# Patient Record
Sex: FEMALE | Race: WHITE | NOT HISPANIC OR LATINO | Employment: OTHER | URBAN - METROPOLITAN AREA
[De-identification: names, ages, dates, MRNs, and addresses within clinical notes are randomized per-mention and may not be internally consistent; named-entity substitution may affect disease eponyms.]

---

## 2018-01-01 ENCOUNTER — HOSPITAL ENCOUNTER (EMERGENCY)
Facility: HOSPITAL | Age: 58
Discharge: HOME | End: 2018-11-08
Attending: EMERGENCY MEDICINE | Admitting: EMERGENCY MEDICINE

## 2018-01-01 ENCOUNTER — APPOINTMENT (OUTPATIENT)
Dept: GENERAL RADIOLOGY | Facility: HOSPITAL | Age: 58
End: 2018-01-01

## 2018-01-01 ENCOUNTER — HOSPITAL ENCOUNTER (OUTPATIENT)
Facility: HOSPITAL | Age: 58
Setting detail: OBSERVATION
LOS: 1 days | Discharge: HOME | End: 2018-10-04
Attending: EMERGENCY MEDICINE | Admitting: EMERGENCY MEDICINE

## 2018-01-01 ENCOUNTER — APPOINTMENT (OUTPATIENT)
Dept: CT IMAGING | Facility: HOSPITAL | Age: 58
End: 2018-01-01

## 2018-01-01 VITALS
SYSTOLIC BLOOD PRESSURE: 210 MMHG | HEIGHT: 59 IN | TEMPERATURE: 97.7 F | HEART RATE: 67 BPM | OXYGEN SATURATION: 100 % | DIASTOLIC BLOOD PRESSURE: 94 MMHG | RESPIRATION RATE: 16 BRPM

## 2018-01-01 VITALS
HEIGHT: 60 IN | OXYGEN SATURATION: 96 % | RESPIRATION RATE: 18 BRPM | DIASTOLIC BLOOD PRESSURE: 82 MMHG | TEMPERATURE: 98.2 F | BODY MASS INDEX: 52.03 KG/M2 | SYSTOLIC BLOOD PRESSURE: 173 MMHG | HEART RATE: 68 BPM | WEIGHT: 264.99 LBS

## 2018-01-01 DIAGNOSIS — R41.82 ALTERED MENTAL STATUS, UNSPECIFIED ALTERED MENTAL STATUS TYPE: Primary | ICD-10-CM

## 2018-01-01 DIAGNOSIS — N18.9 CHRONIC KIDNEY DISEASE, UNSPECIFIED CKD STAGE: ICD-10-CM

## 2018-01-01 DIAGNOSIS — E87.5 HYPERKALEMIA: Primary | ICD-10-CM

## 2018-01-01 DIAGNOSIS — I10 HYPERTENSION, UNSPECIFIED TYPE: ICD-10-CM

## 2018-01-01 DIAGNOSIS — E16.2 HYPOGLYCEMIA: ICD-10-CM

## 2018-01-01 DIAGNOSIS — M79.89 SWELLING OF LEFT HAND: ICD-10-CM

## 2018-01-01 DIAGNOSIS — N39.0 ACUTE UTI: ICD-10-CM

## 2018-01-01 DIAGNOSIS — N19 RENAL FAILURE, UNSPECIFIED CHRONICITY: ICD-10-CM

## 2018-01-01 LAB
ALBUMIN SERPL-MCNC: 2.5 G/DL (ref 3.5–5.2)
ALBUMIN SERPL-MCNC: 2.9 G/DL (ref 3.5–5.2)
ALBUMIN SERPL-MCNC: 3.2 G/DL (ref 3.5–5.2)
ALBUMIN SERPL-MCNC: 3.5 G/DL (ref 3.5–5.2)
ALBUMIN/GLOB SERPL: 0.7 G/DL
ALBUMIN/GLOB SERPL: 0.8 G/DL
ALBUMIN/GLOB SERPL: 0.8 G/DL
ALP SERPL-CCNC: 199 U/L (ref 39–117)
ALP SERPL-CCNC: 207 U/L (ref 39–117)
ALP SERPL-CCNC: 248 U/L (ref 39–117)
ALT SERPL W P-5'-P-CCNC: 17 U/L (ref 1–33)
ALT SERPL W P-5'-P-CCNC: 21 U/L (ref 1–33)
ALT SERPL W P-5'-P-CCNC: 21 U/L (ref 1–33)
ANION GAP SERPL CALCULATED.3IONS-SCNC: 10.3 MMOL/L
ANION GAP SERPL CALCULATED.3IONS-SCNC: 10.8 MMOL/L
ANION GAP SERPL CALCULATED.3IONS-SCNC: 13.4 MMOL/L
ANION GAP SERPL CALCULATED.3IONS-SCNC: 13.7 MMOL/L
AST SERPL-CCNC: 18 U/L (ref 1–32)
AST SERPL-CCNC: 24 U/L (ref 1–32)
AST SERPL-CCNC: 24 U/L (ref 1–32)
BACTERIA SPEC AEROBE CULT: ABNORMAL
BACTERIA SPEC AEROBE CULT: NORMAL
BACTERIA SPEC AEROBE CULT: NORMAL
BACTERIA UR QL AUTO: ABNORMAL /HPF
BASOPHILS # BLD AUTO: 0.01 10*3/MM3 (ref 0–0.2)
BASOPHILS # BLD AUTO: 0.02 10*3/MM3 (ref 0–0.2)
BASOPHILS # BLD AUTO: 0.02 10*3/MM3 (ref 0–0.2)
BASOPHILS NFR BLD AUTO: 0.1 % (ref 0–1.5)
BASOPHILS NFR BLD AUTO: 0.2 % (ref 0–1.5)
BASOPHILS NFR BLD AUTO: 0.2 % (ref 0–1.5)
BILIRUB SERPL-MCNC: 0.2 MG/DL (ref 0.1–1.2)
BILIRUB SERPL-MCNC: 0.2 MG/DL (ref 0.1–1.2)
BILIRUB SERPL-MCNC: 0.3 MG/DL (ref 0.1–1.2)
BILIRUB UR QL STRIP: NEGATIVE
BUN BLD-MCNC: 29 MG/DL (ref 8–23)
BUN BLD-MCNC: 32 MG/DL (ref 8–23)
BUN BLD-MCNC: 34 MG/DL (ref 8–23)
BUN BLD-MCNC: 44 MG/DL (ref 8–23)
BUN/CREAT SERPL: 10.4 (ref 7–25)
BUN/CREAT SERPL: 11.5 (ref 7–25)
BUN/CREAT SERPL: 12.2 (ref 7–25)
BUN/CREAT SERPL: 14.3 (ref 7–25)
CALCIUM SPEC-SCNC: 8.6 MG/DL (ref 8.6–10.5)
CALCIUM SPEC-SCNC: 8.8 MG/DL (ref 8.6–10.5)
CALCIUM SPEC-SCNC: 9.1 MG/DL (ref 8.6–10.5)
CALCIUM SPEC-SCNC: 9.2 MG/DL (ref 8.6–10.5)
CHLORIDE SERPL-SCNC: 110 MMOL/L (ref 98–107)
CHLORIDE SERPL-SCNC: 110 MMOL/L (ref 98–107)
CHLORIDE SERPL-SCNC: 111 MMOL/L (ref 98–107)
CHLORIDE SERPL-SCNC: 112 MMOL/L (ref 98–107)
CLARITY UR: ABNORMAL
CO2 SERPL-SCNC: 21.2 MMOL/L (ref 22–29)
CO2 SERPL-SCNC: 21.6 MMOL/L (ref 22–29)
CO2 SERPL-SCNC: 22.3 MMOL/L (ref 22–29)
CO2 SERPL-SCNC: 22.7 MMOL/L (ref 22–29)
COLOR UR: YELLOW
CREAT BLD-MCNC: 2.53 MG/DL (ref 0.57–1)
CREAT BLD-MCNC: 2.79 MG/DL (ref 0.57–1)
CREAT BLD-MCNC: 3.07 MG/DL (ref 0.57–1)
CREAT BLD-MCNC: 3.07 MG/DL (ref 0.57–1)
D-LACTATE SERPL-SCNC: 1.9 MMOL/L (ref 0.5–2)
DEPRECATED RDW RBC AUTO: 53.3 FL (ref 37–54)
DEPRECATED RDW RBC AUTO: 55 FL (ref 37–54)
DEPRECATED RDW RBC AUTO: 55.2 FL (ref 37–54)
DEPRECATED RDW RBC AUTO: 55.4 FL (ref 37–54)
EOSINOPHIL # BLD AUTO: 0.17 10*3/MM3 (ref 0–0.7)
EOSINOPHIL # BLD AUTO: 0.23 10*3/MM3 (ref 0–0.7)
EOSINOPHIL # BLD AUTO: 0.44 10*3/MM3 (ref 0–0.7)
EOSINOPHIL NFR BLD AUTO: 1.4 % (ref 0.3–6.2)
EOSINOPHIL NFR BLD AUTO: 1.8 % (ref 0.3–6.2)
EOSINOPHIL NFR BLD AUTO: 4 % (ref 0.3–6.2)
ERYTHROCYTE [DISTWIDTH] IN BLOOD BY AUTOMATED COUNT: 14.7 % (ref 11.7–13)
ERYTHROCYTE [DISTWIDTH] IN BLOOD BY AUTOMATED COUNT: 15.1 % (ref 11.7–13)
ERYTHROCYTE [DISTWIDTH] IN BLOOD BY AUTOMATED COUNT: 15.2 % (ref 11.7–13)
ERYTHROCYTE [DISTWIDTH] IN BLOOD BY AUTOMATED COUNT: 15.5 % (ref 11.7–13)
GFR SERPL CREATININE-BSD FRML MDRD: 15 ML/MIN/1.73
GFR SERPL CREATININE-BSD FRML MDRD: 15 ML/MIN/1.73
GFR SERPL CREATININE-BSD FRML MDRD: 17 ML/MIN/1.73
GFR SERPL CREATININE-BSD FRML MDRD: 19 ML/MIN/1.73
GLOBULIN UR ELPH-MCNC: 3.9 GM/DL
GLOBULIN UR ELPH-MCNC: 4.1 GM/DL
GLOBULIN UR ELPH-MCNC: 4.2 GM/DL
GLUCOSE BLD-MCNC: 148 MG/DL (ref 65–99)
GLUCOSE BLD-MCNC: 52 MG/DL (ref 65–99)
GLUCOSE BLD-MCNC: 75 MG/DL (ref 65–99)
GLUCOSE BLD-MCNC: 95 MG/DL (ref 65–99)
GLUCOSE BLDC GLUCOMTR-MCNC: 132 MG/DL (ref 70–130)
GLUCOSE BLDC GLUCOMTR-MCNC: 141 MG/DL (ref 70–130)
GLUCOSE BLDC GLUCOMTR-MCNC: 175 MG/DL (ref 70–130)
GLUCOSE BLDC GLUCOMTR-MCNC: 179 MG/DL (ref 70–130)
GLUCOSE BLDC GLUCOMTR-MCNC: 185 MG/DL (ref 70–130)
GLUCOSE BLDC GLUCOMTR-MCNC: 195 MG/DL (ref 70–130)
GLUCOSE BLDC GLUCOMTR-MCNC: 263 MG/DL (ref 70–130)
GLUCOSE BLDC GLUCOMTR-MCNC: 74 MG/DL (ref 70–130)
GLUCOSE BLDC GLUCOMTR-MCNC: 75 MG/DL (ref 70–130)
GLUCOSE BLDC GLUCOMTR-MCNC: 84 MG/DL (ref 70–130)
GLUCOSE UR STRIP-MCNC: ABNORMAL MG/DL
HBA1C MFR BLD: 6.7 % (ref 4.8–5.6)
HCT VFR BLD AUTO: 32.4 % (ref 35.6–45.5)
HCT VFR BLD AUTO: 34.8 % (ref 35.6–45.5)
HCT VFR BLD AUTO: 35.7 % (ref 35.6–45.5)
HCT VFR BLD AUTO: 37 % (ref 35.6–45.5)
HGB BLD-MCNC: 10.5 G/DL (ref 11.9–15.5)
HGB BLD-MCNC: 10.7 G/DL (ref 11.9–15.5)
HGB BLD-MCNC: 11.3 G/DL (ref 11.9–15.5)
HGB BLD-MCNC: 9.8 G/DL (ref 11.9–15.5)
HGB UR QL STRIP.AUTO: ABNORMAL
HYALINE CASTS UR QL AUTO: ABNORMAL /LPF
IMM GRANULOCYTES # BLD: 0.04 10*3/MM3 (ref 0–0.03)
IMM GRANULOCYTES # BLD: 0.05 10*3/MM3 (ref 0–0.03)
IMM GRANULOCYTES # BLD: 0.06 10*3/MM3 (ref 0–0.03)
IMM GRANULOCYTES NFR BLD: 0.3 % (ref 0–0.5)
IMM GRANULOCYTES NFR BLD: 0.4 % (ref 0–0.5)
IMM GRANULOCYTES NFR BLD: 0.5 % (ref 0–0.5)
KETONES UR QL STRIP: NEGATIVE
LEUKOCYTE ESTERASE UR QL STRIP.AUTO: ABNORMAL
LYMPHOCYTES # BLD AUTO: 1.56 10*3/MM3 (ref 0.9–4.8)
LYMPHOCYTES # BLD AUTO: 2 10*3/MM3 (ref 0.9–4.8)
LYMPHOCYTES # BLD AUTO: 2.04 10*3/MM3 (ref 0.9–4.8)
LYMPHOCYTES NFR BLD AUTO: 14.1 % (ref 19.6–45.3)
LYMPHOCYTES NFR BLD AUTO: 16 % (ref 19.6–45.3)
LYMPHOCYTES NFR BLD AUTO: 16.1 % (ref 19.6–45.3)
MAGNESIUM SERPL-MCNC: 2.2 MG/DL (ref 1.6–2.4)
MCH RBC QN AUTO: 29.5 PG (ref 26.9–32)
MCH RBC QN AUTO: 29.6 PG (ref 26.9–32)
MCH RBC QN AUTO: 30.1 PG (ref 26.9–32)
MCH RBC QN AUTO: 30.3 PG (ref 26.9–32)
MCHC RBC AUTO-ENTMCNC: 30 G/DL (ref 32.4–36.3)
MCHC RBC AUTO-ENTMCNC: 30.2 G/DL (ref 32.4–36.3)
MCHC RBC AUTO-ENTMCNC: 30.2 G/DL (ref 32.4–36.3)
MCHC RBC AUTO-ENTMCNC: 30.5 G/DL (ref 32.4–36.3)
MCV RBC AUTO: 98 FL (ref 80.5–98.2)
MCV RBC AUTO: 98.3 FL (ref 80.5–98.2)
MCV RBC AUTO: 99.2 FL (ref 80.5–98.2)
MCV RBC AUTO: 99.4 FL (ref 80.5–98.2)
MONOCYTES # BLD AUTO: 0.63 10*3/MM3 (ref 0.2–1.2)
MONOCYTES # BLD AUTO: 0.91 10*3/MM3 (ref 0.2–1.2)
MONOCYTES # BLD AUTO: 1.1 10*3/MM3 (ref 0.2–1.2)
MONOCYTES NFR BLD AUTO: 5.7 % (ref 5–12)
MONOCYTES NFR BLD AUTO: 7.1 % (ref 5–12)
MONOCYTES NFR BLD AUTO: 8.8 % (ref 5–12)
MUCOUS THREADS URNS QL MICRO: ABNORMAL /HPF
NEUTROPHILS # BLD AUTO: 8.39 10*3/MM3 (ref 1.9–8.1)
NEUTROPHILS # BLD AUTO: 9.14 10*3/MM3 (ref 1.9–8.1)
NEUTROPHILS # BLD AUTO: 9.49 10*3/MM3 (ref 1.9–8.1)
NEUTROPHILS NFR BLD AUTO: 73.5 % (ref 42.7–76)
NEUTROPHILS NFR BLD AUTO: 74.5 % (ref 42.7–76)
NEUTROPHILS NFR BLD AUTO: 76.1 % (ref 42.7–76)
NITRITE UR QL STRIP: POSITIVE
PH UR STRIP.AUTO: 6.5 [PH] (ref 5–8)
PHOSPHATE SERPL-MCNC: 4.4 MG/DL (ref 2.5–4.5)
PLATELET # BLD AUTO: 213 10*3/MM3 (ref 140–500)
PLATELET # BLD AUTO: 220 10*3/MM3 (ref 140–500)
PLATELET # BLD AUTO: 236 10*3/MM3 (ref 140–500)
PLATELET # BLD AUTO: 241 10*3/MM3 (ref 140–500)
PMV BLD AUTO: 9.4 FL (ref 6–12)
PMV BLD AUTO: 9.4 FL (ref 6–12)
PMV BLD AUTO: 9.6 FL (ref 6–12)
PMV BLD AUTO: 9.7 FL (ref 6–12)
POTASSIUM BLD-SCNC: 3.7 MMOL/L (ref 3.5–5.2)
POTASSIUM BLD-SCNC: 4.2 MMOL/L (ref 3.5–5.2)
POTASSIUM BLD-SCNC: 4.6 MMOL/L (ref 3.5–5.2)
POTASSIUM BLD-SCNC: 5.8 MMOL/L (ref 3.5–5.2)
PROCALCITONIN SERPL-MCNC: 0.12 NG/ML (ref 0.1–0.25)
PROT SERPL-MCNC: 6.8 G/DL (ref 6–8.5)
PROT SERPL-MCNC: 7.3 G/DL (ref 6–8.5)
PROT SERPL-MCNC: 7.7 G/DL (ref 6–8.5)
PROT UR QL STRIP: ABNORMAL
RBC # BLD AUTO: 3.26 10*6/MM3 (ref 3.9–5.2)
RBC # BLD AUTO: 3.55 10*6/MM3 (ref 3.9–5.2)
RBC # BLD AUTO: 3.63 10*6/MM3 (ref 3.9–5.2)
RBC # BLD AUTO: 3.73 10*6/MM3 (ref 3.9–5.2)
RBC # UR: ABNORMAL /HPF
REF LAB TEST METHOD: ABNORMAL
SODIUM BLD-SCNC: 142 MMOL/L (ref 136–145)
SODIUM BLD-SCNC: 145 MMOL/L (ref 136–145)
SODIUM BLD-SCNC: 145 MMOL/L (ref 136–145)
SODIUM BLD-SCNC: 147 MMOL/L (ref 136–145)
SP GR UR STRIP: 1.02 (ref 1–1.03)
SQUAMOUS #/AREA URNS HPF: ABNORMAL /HPF
T4 FREE SERPL-MCNC: 0.96 NG/DL (ref 0.93–1.7)
TSH SERPL DL<=0.05 MIU/L-ACNC: 9.33 MIU/ML (ref 0.27–4.2)
UROBILINOGEN UR QL STRIP: ABNORMAL
WBC NRBC COR # BLD: 11.03 10*3/MM3 (ref 4.5–10.7)
WBC NRBC COR # BLD: 12.43 10*3/MM3 (ref 4.5–10.7)
WBC NRBC COR # BLD: 12.74 10*3/MM3 (ref 4.5–10.7)
WBC NRBC COR # BLD: 8.88 10*3/MM3 (ref 4.5–10.7)
WBC UR QL AUTO: ABNORMAL /HPF

## 2018-01-01 PROCEDURE — 80069 RENAL FUNCTION PANEL: CPT | Performed by: HOSPITALIST

## 2018-01-01 PROCEDURE — 87186 SC STD MICRODIL/AGAR DIL: CPT | Performed by: EMERGENCY MEDICINE

## 2018-01-01 PROCEDURE — 96376 TX/PRO/DX INJ SAME DRUG ADON: CPT

## 2018-01-01 PROCEDURE — 80053 COMPREHEN METABOLIC PANEL: CPT | Performed by: PHYSICIAN ASSISTANT

## 2018-01-01 PROCEDURE — 73130 X-RAY EXAM OF HAND: CPT

## 2018-01-01 PROCEDURE — 84439 ASSAY OF FREE THYROXINE: CPT | Performed by: EMERGENCY MEDICINE

## 2018-01-01 PROCEDURE — 85025 COMPLETE CBC W/AUTO DIFF WBC: CPT | Performed by: PHYSICIAN ASSISTANT

## 2018-01-01 PROCEDURE — 80053 COMPREHEN METABOLIC PANEL: CPT | Performed by: NURSE PRACTITIONER

## 2018-01-01 PROCEDURE — 85027 COMPLETE CBC AUTOMATED: CPT | Performed by: HOSPITALIST

## 2018-01-01 PROCEDURE — 96365 THER/PROPH/DIAG IV INF INIT: CPT

## 2018-01-01 PROCEDURE — 96375 TX/PRO/DX INJ NEW DRUG ADDON: CPT

## 2018-01-01 PROCEDURE — 25010000002 INFLUENZA VAC SUBUNIT QUAD 0.5 ML SUSPENSION PREFILLED SYRINGE: Performed by: HOSPITALIST

## 2018-01-01 PROCEDURE — 85025 COMPLETE CBC W/AUTO DIFF WBC: CPT | Performed by: NURSE PRACTITIONER

## 2018-01-01 PROCEDURE — 83036 HEMOGLOBIN GLYCOSYLATED A1C: CPT | Performed by: NURSE PRACTITIONER

## 2018-01-01 PROCEDURE — 83605 ASSAY OF LACTIC ACID: CPT | Performed by: EMERGENCY MEDICINE

## 2018-01-01 PROCEDURE — 84145 PROCALCITONIN (PCT): CPT | Performed by: EMERGENCY MEDICINE

## 2018-01-01 PROCEDURE — G0378 HOSPITAL OBSERVATION PER HR: HCPCS

## 2018-01-01 PROCEDURE — 63710000001 INSULIN ASPART PER 5 UNITS: Performed by: HOSPITALIST

## 2018-01-01 PROCEDURE — 82962 GLUCOSE BLOOD TEST: CPT

## 2018-01-01 PROCEDURE — 63710000001 INSULIN ASPART PER 5 UNITS: Performed by: NURSE PRACTITIONER

## 2018-01-01 PROCEDURE — 87040 BLOOD CULTURE FOR BACTERIA: CPT | Performed by: EMERGENCY MEDICINE

## 2018-01-01 PROCEDURE — 84443 ASSAY THYROID STIM HORMONE: CPT | Performed by: EMERGENCY MEDICINE

## 2018-01-01 PROCEDURE — 83735 ASSAY OF MAGNESIUM: CPT | Performed by: EMERGENCY MEDICINE

## 2018-01-01 PROCEDURE — 25010000002 CEFTRIAXONE PER 250 MG: Performed by: NURSE PRACTITIONER

## 2018-01-01 PROCEDURE — 96361 HYDRATE IV INFUSION ADD-ON: CPT

## 2018-01-01 PROCEDURE — 25010000002 CEFTRIAXONE PER 250 MG: Performed by: EMERGENCY MEDICINE

## 2018-01-01 PROCEDURE — 99285 EMERGENCY DEPT VISIT HI MDM: CPT

## 2018-01-01 PROCEDURE — 81001 URINALYSIS AUTO W/SCOPE: CPT | Performed by: EMERGENCY MEDICINE

## 2018-01-01 PROCEDURE — 87086 URINE CULTURE/COLONY COUNT: CPT | Performed by: EMERGENCY MEDICINE

## 2018-01-01 PROCEDURE — G0008 ADMIN INFLUENZA VIRUS VAC: HCPCS | Performed by: HOSPITALIST

## 2018-01-01 PROCEDURE — 80053 COMPREHEN METABOLIC PANEL: CPT | Performed by: EMERGENCY MEDICINE

## 2018-01-01 PROCEDURE — 96366 THER/PROPH/DIAG IV INF ADDON: CPT

## 2018-01-01 PROCEDURE — 99284 EMERGENCY DEPT VISIT MOD MDM: CPT

## 2018-01-01 PROCEDURE — 85025 COMPLETE CBC W/AUTO DIFF WBC: CPT | Performed by: EMERGENCY MEDICINE

## 2018-01-01 PROCEDURE — 90661 CCIIV3 VAC ABX FR 0.5 ML IM: CPT | Performed by: HOSPITALIST

## 2018-01-01 PROCEDURE — 70450 CT HEAD/BRAIN W/O DYE: CPT

## 2018-01-01 PROCEDURE — 90791 PSYCH DIAGNOSTIC EVALUATION: CPT | Performed by: SOCIAL WORKER

## 2018-01-01 RX ORDER — FUROSEMIDE 20 MG/1
20 TABLET ORAL 2 TIMES DAILY
Status: ON HOLD | COMMUNITY
End: 2019-01-01

## 2018-01-01 RX ORDER — SODIUM CHLORIDE 0.9 % (FLUSH) 0.9 %
1-10 SYRINGE (ML) INJECTION AS NEEDED
Status: DISCONTINUED | OUTPATIENT
Start: 2018-01-01 | End: 2018-01-01 | Stop reason: HOSPADM

## 2018-01-01 RX ORDER — QUETIAPINE FUMARATE 25 MG/1
25 TABLET, FILM COATED ORAL
Qty: 30 TABLET | Refills: 0 | Status: ON HOLD | OUTPATIENT
Start: 2018-01-01 | End: 2019-01-01

## 2018-01-01 RX ORDER — SODIUM CHLORIDE 0.9 % (FLUSH) 0.9 %
3 SYRINGE (ML) INJECTION EVERY 12 HOURS SCHEDULED
Status: DISCONTINUED | OUTPATIENT
Start: 2018-01-01 | End: 2018-01-01

## 2018-01-01 RX ORDER — ESCITALOPRAM OXALATE 10 MG/1
10 TABLET ORAL DAILY
Qty: 30 TABLET | Refills: 0 | Status: ON HOLD | OUTPATIENT
Start: 2018-01-01 | End: 2019-01-01

## 2018-01-01 RX ORDER — SODIUM CHLORIDE 0.9 % (FLUSH) 0.9 %
10 SYRINGE (ML) INJECTION AS NEEDED
Status: DISCONTINUED | OUTPATIENT
Start: 2018-01-01 | End: 2018-01-01 | Stop reason: HOSPADM

## 2018-01-01 RX ORDER — INSULIN ASPART 100 [IU]/ML
35 INJECTION, SUSPENSION SUBCUTANEOUS 2 TIMES DAILY WITH MEALS
Refills: 12 | Status: ON HOLD
Start: 2018-01-01 | End: 2019-01-01

## 2018-01-01 RX ORDER — AMLODIPINE BESYLATE 5 MG/1
5 TABLET ORAL
Status: DISCONTINUED | OUTPATIENT
Start: 2018-01-01 | End: 2018-01-01 | Stop reason: HOSPADM

## 2018-01-01 RX ORDER — CEFTRIAXONE SODIUM 1 G/50ML
1 INJECTION, SOLUTION INTRAVENOUS EVERY 24 HOURS
Status: DISCONTINUED | OUTPATIENT
Start: 2018-01-01 | End: 2018-01-01 | Stop reason: HOSPADM

## 2018-01-01 RX ORDER — TOPIRAMATE 25 MG/1
25 TABLET ORAL EVERY 12 HOURS SCHEDULED
Status: DISCONTINUED | OUTPATIENT
Start: 2018-01-01 | End: 2018-01-01 | Stop reason: HOSPADM

## 2018-01-01 RX ORDER — INSULIN ASPART 100 [IU]/ML
90 INJECTION, SUSPENSION SUBCUTANEOUS 2 TIMES DAILY WITH MEALS
Status: DISCONTINUED | OUTPATIENT
Start: 2018-01-01 | End: 2018-01-01

## 2018-01-01 RX ORDER — LEVOTHYROXINE SODIUM 0.03 MG/1
25 TABLET ORAL
Status: DISCONTINUED | OUTPATIENT
Start: 2018-01-01 | End: 2018-01-01 | Stop reason: HOSPADM

## 2018-01-01 RX ORDER — TOPIRAMATE 25 MG/1
25 CAPSULE, COATED PELLETS ORAL DAILY
Status: ON HOLD | COMMUNITY
End: 2019-01-01

## 2018-01-01 RX ORDER — INSULIN ASPART 100 [IU]/ML
35 INJECTION, SUSPENSION SUBCUTANEOUS 2 TIMES DAILY WITH MEALS
Status: DISCONTINUED | OUTPATIENT
Start: 2018-01-01 | End: 2018-01-01 | Stop reason: HOSPADM

## 2018-01-01 RX ORDER — DEXTROSE MONOHYDRATE 25 G/50ML
25 INJECTION, SOLUTION INTRAVENOUS
Status: DISCONTINUED | OUTPATIENT
Start: 2018-01-01 | End: 2018-01-01 | Stop reason: HOSPADM

## 2018-01-01 RX ORDER — QUETIAPINE FUMARATE 25 MG/1
25 TABLET, FILM COATED ORAL
Status: DISCONTINUED | OUTPATIENT
Start: 2018-01-01 | End: 2018-01-01 | Stop reason: HOSPADM

## 2018-01-01 RX ORDER — LEVOTHYROXINE SODIUM 0.03 MG/1
25 TABLET ORAL DAILY
Qty: 30 TABLET | Refills: 0 | Status: ON HOLD | OUTPATIENT
Start: 2018-01-01 | End: 2019-01-01

## 2018-01-01 RX ORDER — INSULIN ASPART 100 [IU]/ML
40 INJECTION, SUSPENSION SUBCUTANEOUS 2 TIMES DAILY WITH MEALS
Status: DISCONTINUED | OUTPATIENT
Start: 2018-01-01 | End: 2018-01-01

## 2018-01-01 RX ORDER — CEFTRIAXONE SODIUM 1 G/50ML
1 INJECTION, SOLUTION INTRAVENOUS ONCE
Status: COMPLETED | OUTPATIENT
Start: 2018-01-01 | End: 2018-01-01

## 2018-01-01 RX ORDER — DEXTROSE MONOHYDRATE 25 G/50ML
25 INJECTION, SOLUTION INTRAVENOUS ONCE
Status: COMPLETED | OUTPATIENT
Start: 2018-01-01 | End: 2018-01-01

## 2018-01-01 RX ORDER — ENALAPRILAT 2.5 MG/2ML
1.25 INJECTION INTRAVENOUS ONCE
Status: COMPLETED | OUTPATIENT
Start: 2018-01-01 | End: 2018-01-01

## 2018-01-01 RX ORDER — CEPHALEXIN 500 MG/1
500 CAPSULE ORAL 2 TIMES DAILY
Qty: 6 CAPSULE | Refills: 0 | Status: ON HOLD | OUTPATIENT
Start: 2018-01-01 | End: 2019-01-01

## 2018-01-01 RX ORDER — SODIUM CHLORIDE 9 MG/ML
100 INJECTION, SOLUTION INTRAVENOUS CONTINUOUS
Status: DISCONTINUED | OUTPATIENT
Start: 2018-01-01 | End: 2018-01-01 | Stop reason: HOSPADM

## 2018-01-01 RX ORDER — DESVENLAFAXINE SUCCINATE 50 MG/1
50 TABLET, EXTENDED RELEASE ORAL DAILY
COMMUNITY
End: 2018-01-01 | Stop reason: HOSPADM

## 2018-01-01 RX ORDER — ATORVASTATIN CALCIUM 10 MG/1
10 TABLET, FILM COATED ORAL DAILY
Status: ON HOLD | COMMUNITY
End: 2019-01-01

## 2018-01-01 RX ORDER — ONDANSETRON 4 MG/1
4 TABLET, ORALLY DISINTEGRATING ORAL EVERY 6 HOURS PRN
Status: DISCONTINUED | OUTPATIENT
Start: 2018-01-01 | End: 2018-01-01 | Stop reason: HOSPADM

## 2018-01-01 RX ORDER — ONDANSETRON 4 MG/1
4 TABLET, FILM COATED ORAL EVERY 6 HOURS PRN
Status: DISCONTINUED | OUTPATIENT
Start: 2018-01-01 | End: 2018-01-01 | Stop reason: HOSPADM

## 2018-01-01 RX ORDER — ESCITALOPRAM OXALATE 10 MG/1
10 TABLET ORAL DAILY
Status: DISCONTINUED | OUTPATIENT
Start: 2018-01-01 | End: 2018-01-01 | Stop reason: HOSPADM

## 2018-01-01 RX ORDER — DEXTROSE MONOHYDRATE 25 G/50ML
INJECTION, SOLUTION INTRAVENOUS
Status: DISPENSED
Start: 2018-01-01 | End: 2018-01-01

## 2018-01-01 RX ORDER — LOSARTAN POTASSIUM 50 MG/1
100 TABLET ORAL DAILY
COMMUNITY
End: 2018-01-01

## 2018-01-01 RX ORDER — ACETAMINOPHEN 325 MG/1
650 TABLET ORAL EVERY 4 HOURS PRN
Status: DISCONTINUED | OUTPATIENT
Start: 2018-01-01 | End: 2018-01-01 | Stop reason: HOSPADM

## 2018-01-01 RX ORDER — NICOTINE POLACRILEX 4 MG
15 LOZENGE BUCCAL
Status: DISCONTINUED | OUTPATIENT
Start: 2018-01-01 | End: 2018-01-01 | Stop reason: HOSPADM

## 2018-01-01 RX ORDER — ONDANSETRON 2 MG/ML
4 INJECTION INTRAMUSCULAR; INTRAVENOUS EVERY 6 HOURS PRN
Status: DISCONTINUED | OUTPATIENT
Start: 2018-01-01 | End: 2018-01-01 | Stop reason: HOSPADM

## 2018-01-01 RX ORDER — ATORVASTATIN CALCIUM 10 MG/1
10 TABLET, FILM COATED ORAL NIGHTLY
Status: DISCONTINUED | OUTPATIENT
Start: 2018-01-01 | End: 2018-01-01 | Stop reason: HOSPADM

## 2018-01-01 RX ADMIN — INSULIN ASPART 2 UNITS: 100 INJECTION, SOLUTION INTRAVENOUS; SUBCUTANEOUS at 13:00

## 2018-01-01 RX ADMIN — DEXTROSE MONOHYDRATE 25 G: 25 INJECTION, SOLUTION INTRAVENOUS at 21:53

## 2018-01-01 RX ADMIN — CEFTRIAXONE SODIUM 1 G: 1 INJECTION, SOLUTION INTRAVENOUS at 23:45

## 2018-01-01 RX ADMIN — DEXTROSE MONOHYDRATE 25 G: 25 INJECTION, SOLUTION INTRAVENOUS at 23:47

## 2018-01-01 RX ADMIN — TOPIRAMATE 25 MG: 25 TABLET, FILM COATED ORAL at 20:07

## 2018-01-01 RX ADMIN — AMLODIPINE BESYLATE 5 MG: 5 TABLET ORAL at 08:47

## 2018-01-01 RX ADMIN — TOPIRAMATE 25 MG: 25 TABLET, FILM COATED ORAL at 08:47

## 2018-01-01 RX ADMIN — INSULIN ASPART 35 UNITS: 100 INJECTION, SUSPENSION SUBCUTANEOUS at 18:31

## 2018-01-01 RX ADMIN — ESCITALOPRAM 10 MG: 10 TABLET, FILM COATED ORAL at 12:37

## 2018-01-01 RX ADMIN — TOPIRAMATE 25 MG: 25 TABLET, FILM COATED ORAL at 12:38

## 2018-01-01 RX ADMIN — ENALAPRILAT 1.25 MG: 1.25 INJECTION INTRAVENOUS at 00:51

## 2018-01-01 RX ADMIN — SODIUM CHLORIDE 500 ML: 9 INJECTION, SOLUTION INTRAVENOUS at 00:19

## 2018-01-01 RX ADMIN — INFLUENZA A VIRUS A/SINGAPORE/GP1908/2015 IVR-180 (H1N1) ANTIGEN (MDCK CELL DERIVED, PROPIOLACTONE INACTIVATED), INFLUENZA A VIRUS A/NORTH CAROLINA/04/2016 (H3N2) HEMAGGLUTININ ANTIGEN (MDCK CELL DERIVED, PROPIOLACTONE INACTIVATED), INFLUENZA B VIRUS B/IOWA/06/2017 HEMAGGLUTININ ANTIGEN (MDCK CELL DERIVED, PROPIOLACTONE INACTIVATED), INFLUENZA B VIRUS B/SINGAPORE/INFTT-16-0610/2016 HEMAGGLUTININ ANTIGEN (MDCK CELL DERIVED, PROPIOLACTONE INACTIVATED) 0.5 ML: 15; 15; 15; 15 INJECTION, SUSPENSION INTRAMUSCULAR at 13:18

## 2018-01-01 RX ADMIN — SODIUM CHLORIDE 100 ML/HR: 9 INJECTION, SOLUTION INTRAVENOUS at 02:50

## 2018-01-01 RX ADMIN — INSULIN ASPART 35 UNITS: 100 INJECTION, SUSPENSION SUBCUTANEOUS at 08:41

## 2018-01-01 RX ADMIN — CEFTRIAXONE SODIUM 1 G: 1 INJECTION, SOLUTION INTRAVENOUS at 00:52

## 2018-01-01 RX ADMIN — AMLODIPINE BESYLATE 5 MG: 5 TABLET ORAL at 12:37

## 2018-01-01 RX ADMIN — LEVOTHYROXINE SODIUM 25 MCG: 25 TABLET ORAL at 06:46

## 2018-01-01 RX ADMIN — INSULIN ASPART 2 UNITS: 100 INJECTION, SOLUTION INTRAVENOUS; SUBCUTANEOUS at 12:03

## 2018-01-01 RX ADMIN — ATORVASTATIN CALCIUM 10 MG: 10 TABLET, FILM COATED ORAL at 20:07

## 2018-01-01 RX ADMIN — INSULIN ASPART 4 UNITS: 100 INJECTION, SOLUTION INTRAVENOUS; SUBCUTANEOUS at 18:31

## 2018-01-01 RX ADMIN — METOPROLOL TARTRATE 25 MG: 25 TABLET ORAL at 12:41

## 2018-01-01 RX ADMIN — ESCITALOPRAM 10 MG: 10 TABLET, FILM COATED ORAL at 08:47

## 2018-01-01 RX ADMIN — QUETIAPINE FUMARATE 25 MG: 25 TABLET ORAL at 18:31

## 2018-01-01 RX ADMIN — METOPROLOL TARTRATE 25 MG: 25 TABLET ORAL at 08:47

## 2018-01-01 RX ADMIN — LEVOTHYROXINE SODIUM 25 MCG: 25 TABLET ORAL at 12:37

## 2018-01-01 RX ADMIN — METOPROLOL TARTRATE 25 MG: 25 TABLET ORAL at 20:07

## 2018-10-03 PROBLEM — R41.82 ALTERED MENTAL STATUS: Status: ACTIVE | Noted: 2018-01-01

## 2018-10-03 PROBLEM — N17.9 ARF (ACUTE RENAL FAILURE) (HCC): Status: ACTIVE | Noted: 2018-01-01

## 2018-10-03 PROBLEM — N18.30 CKD (CHRONIC KIDNEY DISEASE) STAGE 3, GFR 30-59 ML/MIN (HCC): Status: ACTIVE | Noted: 2018-01-01

## 2018-10-03 PROBLEM — E87.0 HYPERNATREMIA: Status: ACTIVE | Noted: 2018-01-01

## 2018-10-03 PROBLEM — F79 MENTAL RETARDATION: Status: ACTIVE | Noted: 2018-01-01

## 2018-10-03 PROBLEM — E11.29 TYPE 2 DIABETES MELLITUS WITH RENAL COMPLICATION (HCC): Status: ACTIVE | Noted: 2018-01-01

## 2018-10-03 PROBLEM — G93.40 ENCEPHALOPATHY: Status: ACTIVE | Noted: 2018-01-01

## 2018-10-03 PROBLEM — N39.0 UTI (URINARY TRACT INFECTION): Status: ACTIVE | Noted: 2018-01-01

## 2018-10-03 PROBLEM — I10 HTN (HYPERTENSION): Status: ACTIVE | Noted: 2018-01-01

## 2018-10-03 NOTE — PLAN OF CARE
Problem: Patient Care Overview  Goal: Plan of Care Review  Outcome: Ongoing (interventions implemented as appropriate)   10/03/18 0300   Coping/Psychosocial   Plan of Care Reviewed With patient;mother   Plan of Care Review   Progress no change   OTHER   Outcome Summary pt admitted from home with mom, caregiver. pt with increased confusion for several days. uti positive. Pt alert but disoriented at baseline. BP elevated. MD to see. Will CTM     Goal: Discharge Needs Assessment  Outcome: Ongoing (interventions implemented as appropriate)      Problem: Urinary Tract Infection (Adult)  Goal: Signs and Symptoms of Listed Potential Problems Will be Absent, Minimized or Managed (Urinary Tract Infection)  Outcome: Ongoing (interventions implemented as appropriate)

## 2018-10-03 NOTE — PLAN OF CARE
Problem: Patient Care Overview  Goal: Plan of Care Review  Outcome: Ongoing (interventions implemented as appropriate)   10/03/18 9470   Coping/Psychosocial   Plan of Care Reviewed With patient   Plan of Care Review   Progress improving   OTHER   Outcome Summary VSS, no c/o pain, has been taking meds as ordered, will most likely d/c home tomorrow, will CTM      Goal: Individualization and Mutuality  Outcome: Ongoing (interventions implemented as appropriate)    Goal: Discharge Needs Assessment  Outcome: Ongoing (interventions implemented as appropriate)    Goal: Interprofessional Rounds/Family Conf  Outcome: Ongoing (interventions implemented as appropriate)      Problem: Urinary Tract Infection (Adult)  Goal: Signs and Symptoms of Listed Potential Problems Will be Absent, Minimized or Managed (Urinary Tract Infection)  Outcome: Ongoing (interventions implemented as appropriate)      Problem: Fall Risk (Adult)  Goal: Identify Related Risk Factors and Signs and Symptoms  Outcome: Ongoing (interventions implemented as appropriate)    Goal: Absence of Fall  Outcome: Ongoing (interventions implemented as appropriate)

## 2018-10-03 NOTE — ED PROVIDER NOTES
" EMERGENCY DEPARTMENT ENCOUNTER    CHIEF COMPLAINT  Chief Complaint: altered mental status  History given by: patient's family, EMS  History limited by: mental status  Room Number: 21/21  PMD: Vincent Nelly K, APRN      HPI:  Pt is a 58 y.o. female who presents to the ED via EMS w/ family bedside. EMS reports when they arrived on scene pt's BGL was 51. She was administered 50 g of PO glucose. Pt's family advised they contacted EMS after finding the pt hallucinating and behaving abnormally. Pt has hx of DM and MR but family advised pt has had increased hallucinations after her father passed away and had her medication changed 12 days ago. She's also noticed pt having insomnia. Pt's family advised pt's BGL has never dropped and pt has eaten tonight.    Duration: acutely prior to EMS arrival, on-going 12 days  Onset: gradual  Timing: constant  Location: n/a  Radiation: n/a  Quality: \"seeing and talking to people that aren't there\"  Intensity/Severity: moderate  Progression: worsening  Associated Symptoms: hypoglycemia, insomnia  Aggravating Factors: hypoglycemia  Alleviating Factors: none  Previous Episodes: denies  Treatment before arrival: EMS care and intervention, 50 g of glucose.    PAST MEDICAL HISTORY  Active Ambulatory Problems     Diagnosis Date Noted   • No Active Ambulatory Problems     Resolved Ambulatory Problems     Diagnosis Date Noted   • No Resolved Ambulatory Problems     Past Medical History:   Diagnosis Date   • CHF (congestive heart failure) (CMS/[facility])    • Diabetes mellitus (CMS/[facility])    • Hypertension    • Mental retardation    • Renal disorder        PAST SURGICAL HISTORY  History reviewed. No pertinent surgical history.    FAMILY HISTORY  History reviewed. No pertinent family history.    SOCIAL HISTORY  Social History     Social History   • Marital status: Single     Spouse name: N/A   • Number of children: N/A   • Years of education: N/A     Occupational History   • Not on file.     Social " History Main Topics   • Smoking status: Never Smoker   • Smokeless tobacco: Not on file   • Alcohol use No   • Drug use: No   • Sexual activity: Not on file     Other Topics Concern   • Not on file     Social History Narrative   • No narrative on file       ALLERGIES  Patient has no known allergies.    REVIEW OF SYSTEMS  Review of Systems   Unable to perform ROS: Other (Pt has hx of MR)       PHYSICAL EXAM  ED Triage Vitals   Temp Heart Rate Resp BP SpO2   10/02/18 2204 10/02/18 2153 10/02/18 2153 10/02/18 2153 10/02/18 2153   94.4 °F (34.7 °C) 65 18 (!) 183/80 99 %      Temp src Heart Rate Source Patient Position BP Location FiO2 (%)   10/02/18 2204 10/02/18 2153 10/02/18 2153 10/02/18 2153 --   Tympanic Monitor Sitting Right arm        Physical Exam   Constitutional: She is well-developed, well-nourished, and in no distress. No distress.   Has MR but cooperative.   HENT:   Head: Normocephalic and atraumatic.   Mouth/Throat: Mucous membranes are normal.   Eyes: Pupils are equal, round, and reactive to light. EOM are normal.   Neck: Normal range of motion. Neck supple.   Cardiovascular: Normal rate, regular rhythm and normal heart sounds.    Pulmonary/Chest: Effort normal and breath sounds normal. No respiratory distress.   Abdominal: Soft. There is no tenderness. There is no rebound and no guarding.   Musculoskeletal: Normal range of motion. She exhibits edema.   Mild swelling and tenderness to the L hand   Lymphadenopathy:   Lymph edema to the BLE 4+   Neurological: She is alert.   Skin: Skin is warm and dry. No rash noted.   Psychiatric: Mood and affect normal.   Pt is having visual hallucinations in the room.   Nursing note and vitals reviewed.      LAB RESULTS  Lab Results (last 24 hours)     Procedure Component Value Units Date/Time    POC Glucose Once [[id number]]  (Abnormal) Collected:  10/02/18 2206    Specimen:  Blood Updated:  10/02/18 2210     Glucose 195 (H) mg/dL     Narrative:       Meter: [id number]  : [id number] Alessandro Piña RN    CBC & Differential [[id number]] Collected:  10/02/18 2242    Specimen:  Blood Updated:  10/02/18 2309    Narrative:       The following orders were created for panel order CBC & Differential.  Procedure                               Abnormality         Status                     ---------                               -----------         ------                     CBC Auto Differential[[id number]]        Abnormal            Final result                 Please view results for these tests on the individual orders.    Comprehensive Metabolic Panel [[id number]]  (Abnormal) Collected:  10/02/18 2242    Specimen:  Blood Updated:  10/02/18 2326     Glucose 52 (L) mg/dL      BUN 34 (H) mg/dL      Creatinine 2.79 (H) mg/dL      Sodium 147 (H) mmol/L      Potassium 3.7 mmol/L      Chloride 111 (H) mmol/L      CO2 22.3 mmol/L      Calcium 9.1 mg/dL      Total Protein 7.3 g/dL      Albumin 3.20 (L) g/dL      ALT (SGPT) 21 U/L      AST (SGOT) 24 U/L      Alkaline Phosphatase 207 (H) U/L      Total Bilirubin 0.2 mg/dL      eGFR Non African Amer 17 (L) mL/min/1.73      Globulin 4.1 gm/dL      A/G Ratio 0.8 g/dL      BUN/Creatinine Ratio 12.2     Anion Gap 13.7 mmol/L     Magnesium [[id number]]  (Normal) Collected:  10/02/18 2242    Specimen:  Blood Updated:  10/02/18 2326     Magnesium 2.2 mg/dL     TSH [[id number]]  (Abnormal) Collected:  10/02/18 2242    Specimen:  Blood Updated:  10/02/18 2333     TSH 9.330 (H) mIU/mL     T4, Free [[id number]]  (Normal) Collected:  10/02/18 2242    Specimen:  Blood Updated:  10/02/18 2333     Free T4 0.96 ng/dL     CBC Auto Differential [[id number]]  (Abnormal) Collected:  10/02/18 2242    Specimen:  Blood Updated:  10/02/18 2309     WBC 12.74 (H) 10*3/mm3      RBC 3.63 (L) 10*6/mm3      Hemoglobin 10.7 (L) g/dL      Hematocrit 35.7 %      MCV 98.3 (H) fL      MCH 29.5 pg      MCHC 30.0 (L) g/dL      RDW 14.7 (H) %      RDW-SD 53.3 fl      MPV 9.4 fL       Platelets 241 10*3/mm3      Neutrophil % 74.5 %      Lymphocyte % 16.0 (L) %      Monocyte % 7.1 %      Eosinophil % 1.8 %      Basophil % 0.2 %      Immature Grans % 0.4 %      Neutrophils, Absolute 9.49 (H) 10*3/mm3      Lymphocytes, Absolute 2.04 10*3/mm3      Monocytes, Absolute 0.91 10*3/mm3      Eosinophils, Absolute 0.23 10*3/mm3      Basophils, Absolute 0.02 10*3/mm3      Immature Grans, Absolute 0.05 (H) 10*3/mm3     Procalcitonin [[id number]] Collected:  10/02/18 2242    Specimen:  Blood Updated:  10/03/18 0015    Urinalysis With Culture If Indicated - Urine, Clean Catch [[id number]]  (Abnormal) Collected:  10/02/18 2251    Specimen:  Urine from Urine, Catheter In/Out Updated:  10/02/18 2311     Color, UA Yellow     Appearance, UA Cloudy (A)     pH, UA 6.5     Specific Gravity, UA 1.021     Glucose,  mg/dL (2+) (A)     Ketones, UA Negative     Bilirubin, UA Negative     Blood, UA Moderate (2+) (A)     Protein, UA >=300 mg/dL (3+) (A)     Leuk Esterase, UA Small (1+) (A)     Nitrite, UA Positive (A)     Urobilinogen, UA 0.2 E.U./dL    Urinalysis, Microscopic Only - Urine, Clean Catch [[id number]]  (Abnormal) Collected:  10/02/18 2251    Specimen:  Urine from Urine, Catheter In/Out Updated:  10/02/18 2322     RBC, UA 13-20 (A) /HPF      WBC, UA Too Numerous to Count (A) /HPF      Bacteria, UA 4+ (A) /HPF      Squamous Epithelial Cells, UA 7-12 (A) /HPF      Hyaline Casts, UA 3-6 /LPF      Mucus, UA Small/1+ (A) /HPF      Methodology Manual Light Microscopy    Urine Culture - Urine, [[id number]] Collected:  10/02/18 2251    Specimen:  Urine from Urine, Catheter In/Out Updated:  10/02/18 2322    POC Glucose Once [[id number]]  (Normal) Collected:  10/02/18 2333    Specimen:  Blood Updated:  10/02/18 2335     Glucose 74 mg/dL     Narrative:       Meter: [id number] : [id number] Chris Morocho RN          I ordered the above labs and reviewed the results    RADIOLOGY  CT Head Without Contrast   Final Result        1. Exam is degraded by by motion artifact. However, no definite acute   abnormality is seen.   2.. Mild sinus inflammatory changes.       Radiation dose reduction techniques were utilized, including automated   exposure control and exposure modulation based on body size.       This report was finalized on 10/2/2018 11:42 PM by Dr. Dian Avila M.D.          XR Hand 3+ View Left   Final Result   Diffuse soft tissue swelling involving the left hand and wrist. No   underlying cortical abnormality seen.       This report was finalized on 10/2/2018 11:33 PM by Dr. Dian Avila M.D.               I ordered the above noted radiological studies. Interpreted by radiologist. Reviewed by me in PACS.       PROCEDURES  Procedures      PROGRESS AND CONSULTS     2347  BP- (!) 198/87 HR- 72 Temp- 94.4 °F (34.7 °C) (Tympanic) O2 sat- 99%  Rechecked the patient who is in NAD. Discussed imaging showing nothing acute on there CT head or her left hand. Pt told that her BGL dropped again and her UA showed a significant UTI that will require admission for IV abx. Questioned pt's family about kidney function. Pt's family advised that she is moderate-stage 3. Pt and family told the plan to admit. Pt's family understands and agrees with the plan, all questions answered.    2762  Discussed the pt with Florencia LLOYD for [facility] Wong. She agreed to admit.      MEDICAL DECISION MAKING  Results were reviewed/discussed with the patient and they were also made aware of online access. Pt also made aware that some labs, such as cultures, will not be resulted during ER visit and follow up with PMD is necessary.     MDM  Number of Diagnoses or Management Options  Acute UTI:   Altered mental status, unspecified altered mental status type:   Hypoglycemia:   Renal failure, unspecified chronicity:   Swelling of left hand:      Amount and/or Complexity of Data Reviewed  Clinical lab tests: reviewed (Initial BGL 52, creatinine 2.79, UA  nitrite positive w/ too numerous to count WBC, WBC 12.74)  Tests in the radiology section of CPT®: reviewed (CT head-negative  XR L hand-negative)  Discuss the patient with other providers: yes (Florencia LLOYD for Dr. Tapia, [facility])  Independent visualization of images, tracings, or specimens: yes    Patient Progress  Patient progress: stable         DIAGNOSIS  Final diagnoses:   Altered mental status, unspecified altered mental status type   Hypoglycemia   Acute UTI   Swelling of left hand   Renal failure, unspecified chronicity   Hypertension, unspecified type       DISPOSITION  ADMISSION    Discussed treatment plan and reason for admission with pt/family and admitting physician.  Pt/family voiced understanding of the plan for admission for further testing/treatment as needed.     Latest Documented Vital Signs:  As of 12:32 AM  BP- (!) 198/87 HR- 69 Temp- 94.4 °F (34.7 °C) (Tympanic) O2 sat- 99%    --  Documentation assistance provided by pradeep Marrero for Dr. Still.  Information recorded by the scribe was done at my direction and has been verified and validated by me.     Dionne Marrero  10/03/18 0004       Norberto Still MD  10/03/18 0032

## 2018-10-03 NOTE — H&P
HISTORY AND PHYSICAL   [facility]        Patient Identification:  Name: Virginie Damon  Age: 58 y.o.  Sex: female  :  1960  MRN: [id number]                     Primary Care Physician: Nelly Kaur APRN    Chief Complaint:  Altered mentation    History of Present Illness:   Ms Damon is a pleasant though retarded 58-year-old female who has lived with her mother and father her whole life due to her struggles with mentation.  Patient was very attached to her father who  almost a year ago to the day.  She has struggled with depression most likely as well as anxiety and had changes to her baseline mentation.  This entire history is obtained per mother who is present at bedside.  She had been on Pristiq 50 mg for proximally 5 years and then that was reduced to 25 mg of Zoloft was introduced.  Patient's mother states Zoloft did not help and patient's mentation did not improve and the mother did not continue with use of Zoloft and patient has been off all SSRIs for the last couple weeks.  Mother states that the patient has had decreased sleep and has been having hallucination and has just been off of her baseline for probably the last couple months but states it's gotten a little worse over the last couple weeks.  She checks her blood pressure and blood sugars routinely and she states controls been adequate.  The patient is not voicing any complaints at this juncture as she is pleasant and more or less chimes in at times to times repeating the word yes when the mother gives responses.  In the ER there were concerns of her urinary tract infection though I see the sample obtained was a clean catch.  She was given Rocephin at 1 g.  She is followed by Dr. Townsend with nephrology for her CKD and podiatry for foot hygiene.     Past Medical History:  Past Medical History:   Diagnosis Date   • CHF (congestive heart failure) (CMS/[facility])    • Diabetes mellitus (CMS/[facility])    • Hypertension    • Mental  retardation    • Renal disorder      Past Surgical History:  History reviewed. No pertinent surgical history.   Home Meds:  Prescriptions Prior to Admission   Medication Sig Dispense Refill Last Dose   • atorvastatin (LIPITOR) 10 MG tablet Take 10 mg by mouth Daily.   10/2/2018 at Unknown time   • desvenlafaxine (PRISTIQ) 50 MG 24 hr tablet Take 50 mg by mouth Daily.   10/2/2018 at Unknown time   • furosemide (LASIX) 20 MG tablet Take 20 mg by mouth 2 (Two) Times a Day.   10/2/2018 at Unknown time   • insulin NPH-insulin regular (humuLIN 70/30,novoLIN 70/30) (70-30) 100 UNIT/ML injection Inject 90 Units under the skin into the appropriate area as directed 2 (Two) Times a Day With Meals.   10/2/2018 at Unknown time   • insulin regular (humuLIN R,novoLIN R) 100 UNIT/ML injection Inject  under the skin into the appropriate area as directed 3 (Three) Times a Day Before Meals.   10/2/2018 at Unknown time   • losartan (COZAAR) 50 MG tablet Take 100 mg by mouth Daily.   10/2/2018 at Unknown time   • metoprolol tartrate (LOPRESSOR) 25 MG tablet Take 25 mg by mouth 2 (Two) Times a Day.   10/2/2018 at Unknown time   • Probiotic Product (PROBIOTIC-10 PO) Take  by mouth.   10/2/2018 at Unknown time   • topiramate (TOPAMAX) 25 MG capsule Take 25 mg by mouth 2 (Two) Times a Day.   10/2/2018 at Unknown time       Allergies:  No Known Allergies  Immunizations:  There is no immunization history for the selected administration types on file for this patient.  Social History:   Social History     Social History Narrative   • No narrative on file     Social History     Social History   • Marital status: Single     Spouse name: N/A   • Number of children: N/A   • Years of education: N/A     Occupational History   • Not on file.     Social History Main Topics   • Smoking status: Never Smoker   • Smokeless tobacco: Not on file   • Alcohol use No   • Drug use: No   • Sexual activity: Not on file     Other Topics Concern   • Not on file  "    Social History Narrative   • No narrative on file       Family History:  History reviewed. No pertinent family history.     Review of Systems  See history of present illness and past medical history.   unreliable but she denies any chest pain palpitations cough shortness of breath headache dizziness she is not aware of her confusion.  There's been no issues with nausea vomiting diarrhea.  Remainder of ROS is negative.    Objective:  tMax 24 hrs: Temp (24hrs), Av.4 °F (35.8 °C), Min:94.4 °F (34.7 °C), Max:97.5 °F (36.4 °C)    Vitals Ranges:   Temp:  [94.4 °F (34.7 °C)-97.5 °F (36.4 °C)] 97.5 °F (36.4 °C)  Heart Rate:  [65-96] 74  Resp:  [18] 18  BP: (182-203)/(75-97) 201/97      Exam:  BP (!) 201/97 (BP Location: Right arm, Patient Position: Lying)   Pulse 74   Temp 97.5 °F (36.4 °C) (Oral)   Resp 18   Ht 152.4 cm (60\")   Wt 118 kg (259 lb 4.2 oz)   SpO2 98%   BMI 50.63 kg/m²     General Appearance:    Alert, cooperative, no distress, MR, pleasant, not toxic   Head:    Normocephalic, without obvious abnormality, atraumatic   Eyes:    PERRL, conjunctiva/corneas clear, EOM's intact, both eyes   Ears:    Normal external ear canals, both ears   Nose:   Nares normal, septum midline, mucosa normal, no drainage    or sinus tenderness   Throat:   Lips, mucosa, and tongue normal   Neck:   Supple, symmetrical, trachea midline, no adenopathy;     thyroid:  no enlargement/tenderness/nodules; no meningismus or JVD       Lungs:     Clear to auscultation bilaterally, respirations unlabored        Heart:    Regular rate and rhythm, S1 and S2 normal   Abdomen:     Soft, non-tender, bowel sounds active all four quadrants,     no masses   Extremities:   Severe chronic stasis/lymphedema changes w/out cellulitis    Pulses:   2+ and symmetric all extremities           Neurologic:   CNII-XII intact, normal strength, moving all w/out deficit, no myoclonus      .    Data Review:  Labs in chart were reviewed.      Results from " last 7 days  Lab Units 10/02/18  2242   TSH mIU/mL 9.330*   FREE T4 ng/dL 0.96       Results from last 7 days  Lab Units 10/03/18  0728   HEMOGLOBIN A1C % 6.70*      Imaging Results (all)     Procedure Component Value Units Date/Time    CT Head Without Contrast [[id number]] Collected:  10/02/18 2337     Updated:  10/02/18 2345    Narrative:       CT OF THE HEAD WITHOUT CONTRAST     HISTORY: Confusion     COMPARISON: None available.     TECHNIQUE: Axial CT imaging was obtained from the vertex of skull to the  skull base. No intravenous contrast was administered.     FINDINGS:  Exam is limited by motion artifact. No acute intracranial hemorrhage is  seen. The ventricles are normal in size, and there is no midline shift  or mass effect. No obvious focal areas of decreased attenuation are  noted. There is some mild mucosal thickening within the left maxillary  sinus. The remainder of the visualized paranasal sinuses and mastoid air  cells appear clear. No focal soft tissue normalities are seen.       Impression:          1. Exam is degraded by by motion artifact. However, no definite acute  abnormality is seen.  2.. Mild sinus inflammatory changes.     Radiation dose reduction techniques were utilized, including automated  exposure control and exposure modulation based on body size.     This report was finalized on 10/2/2018 11:42 PM by Dr. Dian Avila M.D.       XR Hand 3+ View Left [[id number]] Collected:  10/02/18 2330     Updated:  10/02/18 2336    Narrative:       3 VIEWS LEFT HAND     HISTORY: Left hand swelling     COMPARISON: None available.     TECHNIQUE: 3 views     FINDINGS:  No acute fracture or subluxation is identified. Patient is noted to have  diffuse soft tissue swelling involving the left hand and left wrist. No  aggressive osseous abnormalities are seen. There are some mild  degenerative changes, most pronounced involving the interphalangeal  joints.       Impression:       Diffuse soft tissue  swelling involving the left hand and wrist. No  underlying cortical abnormality seen.     This report was finalized on 10/2/2018 11:33 PM by Dr. Dian Avila M.D.               Assessment:  Principal Problem:    UTI (urinary tract infection)  Active Problems:    Encephalopathy    Mental retardation    CKD (chronic kidney disease) stage 3, GFR 30-59 ml/min (CMS/[facility])    ARF (acute renal failure) (CMS/[facility])    Hypernatremia    HTN (hypertension)    Type 2 diabetes mellitus with renal complication (CMS/[facility])      Plan:  UTI - ? Validity as sample is CC and patient struggles w/ small tasks secondary to MR    A/CKD3 - IVF - hypernatremia has resolved - followed by Dr Townsend (appt 10/18)    Encephalopathy/MR - sounds more chronic as opposed to acute    -I think best to transition towards more mood stabilizing meds and will Seroquel tonight    -to avoid any issue w/ possible serotonin syndrome - resume SSRI w/ trial of Lexapro     HTN - poorly controlled - hold ARB and substitute out for Norvasc - holding Lasix bid     DM2 - A1c 6.7 - continue bid 70/30 and use Novolog ssi      Hypothyroid - start levothyroxine at 25mcg - repeat TSH 4-6 weeks     SCDs for DVT ppx     OBS admission appropriate as I anticipate DC home tomorrow     Addendum - hypoglycemia could be contributing to her above issues.  Per discussion with the nurse it turns out that this mother does not give the 90 units of NovoLog 70/30 scheduled.  Seems as if she gives it based on what her blood sugar reading is at that time.  We've had a blood sugar here as low as 75.  For now will keep NovoLog sliding scale and use just before meals.  Her blood sugar currently is trending up to 263.  Will continue with her NovoLog Mix 70/30 but cut the dose more than in half to 35 units subcutaneous twice daily and will monitor trends as well as get diabetic education to see mother and morning to help with insulin education and compliance.     Mason Bull,  MD  10/3/2018  10:23 AM

## 2018-10-03 NOTE — CONSULTS
[facility] evaluated pt. Pt's mother was the person giving the information as pt is MR and not able to follow. Pt was actively hallucinating a conversation with someone mother doesn't know. Mother states pt is not distressed by any of the hallucinations. Mother did state pt has had nightmares every night and gets distressed. She states it started about 3-4 weeks ago when medicines were being changed. Pt's sleep has been worse since then. Pt's appetite has not been good in last couple of days, according to mother. Pt has diabetes and kidney issues. Mother says pt has had a swollen left hand for over 7 months. They were told it was gout but it has continued swollen. This has made things more difficult for pt as she is left handed. Mother also stated that pt and her father did everything together. Pt's father  just over a year ago and pt has had a difficult time. The other issue complicating things is that pt had been very active in an adult  program but has not been able to attend for past 7 months. Mother had hip surgery and is still recovering. Mother hopes that pt will return to the  program when she stabilizes. Mother states they are very organized at home due to being a  family. Mother states pt has 3 siblings and they are a very close family. Pt has always been as active as the rest of the family.     Pt lives with mother and will return home upon d/c. Pt active in Christianity, loves music and plays on the IPAD. Pt was started on low dose of Lexapro and will be started on Seroquel tonight to see if that can help pt. Access will follow if pt stays past tomorrow.

## 2018-10-04 PROBLEM — G93.40 ENCEPHALOPATHY: Status: RESOLVED | Noted: 2018-01-01 | Resolved: 2018-01-01

## 2018-10-04 PROBLEM — E87.0 HYPERNATREMIA: Status: RESOLVED | Noted: 2018-01-01 | Resolved: 2018-01-01

## 2018-10-04 NOTE — DISCHARGE SUMMARY
[facility]    Date of Discharge:  10/4/2018    PCP: Nelly Kaur APRN    Discharge Diagnosis:   Active Hospital Problems    Diagnosis Date Noted   • **UTI (urinary tract infection) [N39.0] 10/03/2018   • Mental retardation [F79] 10/03/2018   • CKD (chronic kidney disease) stage 3, GFR 30-59 ml/min (CMS/[facility]) [N18.3] 10/03/2018   • ARF (acute renal failure) (CMS/[facility]) [N17.9] 10/03/2018   • HTN (hypertension) [I10] 10/03/2018   • Type 2 diabetes mellitus with renal complication (CMS/[facility]) [E11.29] 10/03/2018      Resolved Hospital Problems    Diagnosis Date Noted Date Resolved   • Encephalopathy [G93.40] 10/03/2018 10/04/2018   • Hypernatremia [E87.0] 10/03/2018 10/04/2018     Procedures Performed       Consults     Date and Time Order Name Status Description    10/2/2018 3570 [facility] (on-call MD unless specified) Completed         Hospital Course  Please see history and physical for details. Patient is a 58 y.o. female initially admitted by myself for concerns of urinary tract infection.  Her urine did grown a penicillin resistant strain of Escherichia coli but I do worry about contamination as the sample was obtained via Clean catch.  Regardless we treated with Rocephin while here and I'll transition her to Keflex and treat for she very short period of time.  I think the bulk of her issues could revolve around diabetic control and hypoglycemia.  Apparently the patient is prescribed quite high doses of NovoLog 70/30 in which she supposed take 90 units twice daily with Novolin R per sliding scale.  It turns out that the mother who is very well involved in the care of this patient and is an excellent historian on her behalf, states that sometimes she gives it and sometimes she does not so I do have to worry about fluctuating hypoglycemia.  We did note some hypoglycemia while here to levels down to 75.  At discharge I have transitioned her down to scheduled 35  units twice daily and I strongly counseled the mother to follow-up with her endocrinologist who manages her diabetes for further recommendations.  Her A1c is only 6.7 so I suggest avoiding over control.  She was also found to have elevated TSH to 9.3 though free T4 was normal 0.96.  I did start the patient on low-dose levothyroxine 25 µg daily and I'll defer to PCP to recheck her TSH in 4-6 weeks.  I also have concerns that the patient needed more of a mood stabilizing med due to further history obtained from per her mother as the patient lost her father who she was quite close to over the course the last year.  I had concerns for possible serotonin syndrome as it appears that chronic SSRIs were abruptly removed.  I have started her on low-dose Lexapro in the mornings and ultimately she has got more benefit from use of Seroquel 25 mg nightly.  She actually slept for 4-5 hours last night per the mother which is a definite improvement.  I will continue these medications post discharge and the patient already has scheduled follow-up with her PCP which was encouraged to keep.  All questions answered to the mother prior to disposition and I went over all the above medication concerns and changes.  She is amenable to disposition back to home today.  Denies any additional discharge needs.        Condition on Discharge: Improved.     Temp:  [97.5 °F (36.4 °C)-98.3 °F (36.8 °C)] 98.2 °F (36.8 °C)  Heart Rate:  [63-78] 68  Resp:  [18] 18  BP: (141-195)/(67-92) 173/82  Body mass index is 51.75 kg/m².    Physical Exam   Constitutional: She appears well-developed and well-nourished.   Mental retardation   Eyes: Pupils are equal, round, and reactive to light.   Neck: Neck supple. No JVD present.   Cardiovascular: Normal rate and regular rhythm.    Pulmonary/Chest: Effort normal and breath sounds normal. No respiratory distress.   Abdominal: Soft. Bowel sounds are normal. She exhibits no distension. There is no tenderness.    Neurological: She is alert. No cranial nerve deficit.        Discharge Medications      New Medications      Instructions Start Date   cephalexin 500 MG capsule  Commonly known as:  KEFLEX   500 mg, Oral, 2 Times Daily      escitalopram 10 MG tablet  Commonly known as:  LEXAPRO   10 mg, Oral, Daily      insulin aspart prot-insulin aspart (70-30) 100 UNIT/ML injection  Commonly known as:  novoLOG 70/30  Replaces:  insulin NPH-insulin regular (70-30) 100 UNIT/ML injection   35 Units, Subcutaneous, 2 Times Daily With Meals      levothyroxine 25 MCG tablet  Commonly known as:  SYNTHROID, LEVOTHROID   25 mcg, Oral, Daily      QUEtiapine 25 MG tablet  Commonly known as:  SEROquel   25 mg, Oral, Daily With Dinner         Continue These Medications      Instructions Start Date   atorvastatin 10 MG tablet  Commonly known as:  LIPITOR   10 mg, Oral, Daily      furosemide 20 MG tablet  Commonly known as:  LASIX   20 mg, Oral, 2 Times Daily      insulin regular 100 UNIT/ML injection  Commonly known as:  humuLIN R,novoLIN R   Subcutaneous, 3 Times Daily Before Meals      losartan 50 MG tablet  Commonly known as:  COZAAR   100 mg, Oral, Daily      metoprolol tartrate 25 MG tablet  Commonly known as:  LOPRESSOR   25 mg, Oral, 2 Times Daily      PROBIOTIC-10 PO   Oral      topiramate 25 MG capsule  Commonly known as:  TOPAMAX   25 mg, Oral, 2 Times Daily         Stop These Medications    desvenlafaxine 50 MG 24 hr tablet  Commonly known as:  PRISTIQ     insulin NPH-insulin regular (70-30) 100 UNIT/ML injection  Commonly known as:  humuLIN 70/30,novoLIN 70/30  Replaced by:  insulin aspart prot-insulin aspart (70-30) 100 UNIT/ML injection             Additional Instructions for the Follow-ups that You Need to Schedule     Discharge Follow-up with PCP    As directed      Currently Documented PCP:  Nelly Kaur APRN  PCP Phone Number:  [phone]    Follow Up Details:  pcp as scheduled           Follow-up Information      Nelly Kaur, APRN .    Specialty:  Family Medicine  Why:  pcp as scheduled  Contact information:  [address], [place]. [address] [place] [id number] [phone]                 Test Results Pending at Discharge   Order Current Status    Blood Culture - Blood, Preliminary result    Blood Culture - Blood, Preliminary result         Mason Bull MD  10/04/18  12:28 PM    Discharge time spent greater than 30 minutes.

## 2018-10-04 NOTE — PROGRESS NOTES
Case Management Discharge Note    Final Note: Pt was dc'd home, family transported.    Destination     No service has been selected for the patient.      Durable Medical Equipment     No service has been selected for the patient.      Dialysis/Infusion     No service has been selected for the patient.      Home Medical Care     No service has been selected for the patient.      Social Care     No service has been selected for the patient.        Other: Other    Final Discharge Disposition Code: 01 - home or self-care

## 2018-10-04 NOTE — CONSULTS
"Diabetes Education  Assessment/Teaching    Patient Name:  Virginie Damon  YOB: 1960  MRN: [id number]  Admit Date:  10/2/2018      Assessment Date:  10/4/2018    Most Recent Value   General Information    Height  152.4 cm (60\")   Height Method  Stated   Weight  120 kg (264 lb 15.9 oz)   Weight Method  Bed scale   Pregnancy Assessment   Diabetes History   What type of diabetes do you have?  Type 2   Length of Diabetes Diagnosis  10 + years [20 years]   Current DM knowledge  good   Have you had diabetes education/teaching in the past?  yes   When and where was your diabetes education?  [facility]   Do you test your blood sugar at home?  yes   Frequency of checks  2 times a day and as needed   Who performs the test?  mother   Have you had low blood sugar? (<70mg/dl)  yes   How often do you have low blood sugar?  rare   Have you had high blood sugar? (>140mg/dl)  yes   How often do you have high blood sugar?  occasionally   Education Preferences   Nutrition Information   Assessment Topics   Healthy Eating - Assessment  Competent   Being Active - Assessment  Needs education   Taking Medication - Assessment  Needs education   Problem Solving - Assessment  Competent   Reducing Risk - Assessment  Needs education   Healthy Coping - Assessment  Competent   Monitoring - Assessment  Competent   DM Goals            Most Recent Value   DM Education Needs   Meter  Has own   Frequency of Testing  2 times a day   Medication  Insulin, Vial   Problem Solving  Hypoglycemia, Hyperglycemia, Sick days, Signs, Symptoms, Treatment   Reducing Risks  A1C testing, Foot care [sees podiatrist regularly]   Healthy Coping  Appropriate   Discharge Plan  Home   Motivation  Engaged   Teaching Method  Explanation, Discussion, Handouts   Patient Response  Verbalized understanding            Other Comments:  All information was obtained by the mother who is the patient's primary caregiver and who is at the bedside.  Diabetic " x 20 years and on insulin for 19.  Mother checks blood sugars 2 times daily and as needed.  Insulin is given by the mother.  Instructed mother that novolog 70/30 will be decreased to 35 units BID.  She states that she feels like patient was on too much insulin prior to admission.  She does know the signs and symptoms of hypoglycemia and treatment.  Patient sees a podiatrist regularly for nail trim and foot care.  It appears the patient is well cared for and has a very supportive family.  Literature left at the bedside.  Thank you for this referral.  Please reconsult if needed.        Electronically signed by:  Sophia Martinez RN  10/04/18 11:03 AM

## 2018-10-04 NOTE — PLAN OF CARE
Problem: Patient Care Overview  Goal: Plan of Care Review  Outcome: Ongoing (interventions implemented as appropriate)   10/04/18 0420   Coping/Psychosocial   Plan of Care Reviewed With patient;mother   Plan of Care Review   Progress improving   OTHER   Outcome Summary VSS. Pt alert but disoriented. Mentation improved from yesterday. Pt slept throughout night no issues. Pt had auditory halllucination upon waking up in the middle of the night. No new complaints. Will CTM       Problem: Urinary Tract Infection (Adult)  Goal: Signs and Symptoms of Listed Potential Problems Will be Absent, Minimized or Managed (Urinary Tract Infection)  Outcome: Ongoing (interventions implemented as appropriate)      Problem: Fall Risk (Adult)  Goal: Identify Related Risk Factors and Signs and Symptoms  Outcome: Ongoing (interventions implemented as appropriate)    Goal: Absence of Fall  Outcome: Ongoing (interventions implemented as appropriate)

## 2018-11-08 NOTE — ED PROVIDER NOTES
EMERGENCY DEPARTMENT ENCOUNTER    CHIEF COMPLAINT  Chief Complaint: Abnormal Kidney Function  History given by: Pt's family  History limited by: Patient's mental status  Room Number: HALG/G  PMD: Nelly Kaur, GABINO      HPI:  Pt is a 58 y.o. female who presents to ED with an abnormal lab values. Pt is non-verbal, via pt's family, pt had bloodwork done within the past week, and they were told pt's kidney functions were abnormal and they were advised to come to the ED. Pt's family states she has not complained of any vomiting or fever.     Duration:  A few hours  Onset: unknown  Timing: constant  Location: n/a  Radiation: none  Quality: abnormal kidney function  Intensity/Severity: moderate  Progression: unknown  Associated Symptoms: unknown  Aggravating Factors: unknown  Alleviating Factors: unknown  Previous Episodes: unknown  Treatment before arrival: none    PAST MEDICAL HISTORY  Active Ambulatory Problems     Diagnosis Date Noted   • UTI (urinary tract infection) 10/03/2018   • Mental retardation 10/03/2018   • CKD (chronic kidney disease) stage 3, GFR 30-59 ml/min (CMS/[facility]) 10/03/2018   • ARF (acute renal failure) (CMS/[facility]) 10/03/2018   • HTN (hypertension) 10/03/2018   • Type 2 diabetes mellitus with renal complication (CMS/[facility]) 10/03/2018     Resolved Ambulatory Problems     Diagnosis Date Noted   • Encephalopathy 10/03/2018   • Hypernatremia 10/03/2018     Past Medical History:   Diagnosis Date   • CHF (congestive heart failure) (CMS/[facility])    • Diabetes mellitus (CMS/[facility])    • Hypertension    • Mental retardation    • Renal disorder        PAST SURGICAL HISTORY  Past Surgical History:   Procedure Laterality Date   • HERNIA REPAIR         FAMILY HISTORY  History reviewed. No pertinent family history.    SOCIAL HISTORY  Social History     Social History   • Marital status: Single     Spouse name: N/A   • Number of children: N/A   • Years of education: N/A     Occupational History   • Not on file.     Social  History Main Topics   • Smoking status: Never Smoker   • Smokeless tobacco: Never Used   • Alcohol use No   • Drug use: No   • Sexual activity: Defer     Other Topics Concern   • Not on file     Social History Narrative   • No narrative on file       ALLERGIES  Patient has no known allergies.    REVIEW OF SYSTEMS  Review of Systems   Unable to perform ROS: Other (Pt's Mental Status)       PHYSICAL EXAM  ED Triage Vitals [11/08/18 1754]   Temp Heart Rate Resp BP SpO2   97.7 °F (36.5 °C) 70 15 148/88 98 %      Temp src Heart Rate Source Patient Position BP Location FiO2 (%)   Tympanic -- -- -- --       Physical Exam   Constitutional: She is oriented to person, place, and time. No distress.   Pt is obese.   HENT:   Head: Normocephalic and atraumatic.   Eyes: Pupils are equal, round, and reactive to light. EOM are normal.   Neck: Normal range of motion. Neck supple.   Cardiovascular: Normal rate, regular rhythm and normal heart sounds.    Pulmonary/Chest: Effort normal and breath sounds normal. No respiratory distress.   Abdominal: Soft. There is no tenderness. There is no rebound and no guarding.   Musculoskeletal: Normal range of motion. She exhibits edema.   3+ pedal edema of BLE   Neurological: She is alert and oriented to person, place, and time. She has normal sensation and normal strength. She displays abnormal speech (Pt is non-verbal).   Skin: Skin is warm and dry. No rash noted.   Hyperpigmentation of BLE   Psychiatric: Mood and affect normal.   Nursing note and vitals reviewed.      LAB RESULTS  Lab Results (last 24 hours)     Procedure Component Value Units Date/Time    CBC & Differential [[id number]] Collected:  11/08/18 1938    Specimen:  Blood Updated:  11/08/18 2003    Narrative:       The following orders were created for panel order CBC & Differential.  Procedure                               Abnormality         Status                     ---------                               -----------          ------                     CBC Auto Differential[[id number]]        Abnormal            Final result                 Please view results for these tests on the individual orders.    Comprehensive Metabolic Panel [[id number]]  (Abnormal) Collected:  11/08/18 1938    Specimen:  Blood Updated:  11/08/18 2012     Glucose 148 (H) mg/dL      BUN 44 (H) mg/dL      Creatinine 3.07 (H) mg/dL      Sodium 142 mmol/L      Potassium 5.8 (H) mmol/L      Chloride 110 (H) mmol/L      CO2 21.2 (L) mmol/L      Calcium 9.2 mg/dL      Total Protein 7.7 g/dL      Albumin 3.50 g/dL      ALT (SGPT) 17 U/L      AST (SGOT) 18 U/L      Alkaline Phosphatase 248 (H) U/L      Total Bilirubin 0.3 mg/dL      eGFR Non African Amer 15 (L) mL/min/1.73      Globulin 4.2 gm/dL      A/G Ratio 0.8 g/dL      BUN/Creatinine Ratio 14.3     Anion Gap 10.8 mmol/L     CBC Auto Differential [[id number]]  (Abnormal) Collected:  11/08/18 1938    Specimen:  Blood Updated:  11/08/18 2003     WBC 11.03 (H) 10*3/mm3      RBC 3.55 (L) 10*6/mm3      Hemoglobin 10.5 (L) g/dL      Hematocrit 34.8 (L) %      MCV 98.0 fL      MCH 29.6 pg      MCHC 30.2 (L) g/dL      RDW 15.5 (H) %      RDW-SD 55.2 (H) fl      MPV 9.7 fL      Platelets 213 10*3/mm3      Neutrophil % 76.1 (H) %      Lymphocyte % 14.1 (L) %      Monocyte % 5.7 %      Eosinophil % 4.0 %      Basophil % 0.1 %      Immature Grans % 0.5 %      Neutrophils, Absolute 8.39 (H) 10*3/mm3      Lymphocytes, Absolute 1.56 10*3/mm3      Monocytes, Absolute 0.63 10*3/mm3      Eosinophils, Absolute 0.44 10*3/mm3      Basophils, Absolute 0.01 10*3/mm3      Immature Grans, Absolute 0.06 (H) 10*3/mm3           I ordered the above labs and reviewed the results    PROCEDURES  Procedures      PROGRESS AND CONSULTS     1842  Ordered labs for further evaluation of pt condition.     1850  Discussed plan to order labs for further evaluation. Pt's family understands and agrees with the plan, all questions answered.    2006  Pt care  turned over to Dr. Magan Plata who will follow pt through to disposition.      MEDICAL DECISION MAKING  Results were reviewed/discussed with the patient and they were also made aware of online access. Pt also made aware that some labs, such as cultures, will not be resulted during ER visit and follow up with PMD is necessary.     MDM  Number of Diagnoses or Management Options     Amount and/or Complexity of Data Reviewed  Clinical lab tests: ordered and reviewed (WBC- 11.03)  Decide to obtain previous medical records or to obtain history from someone other than the patient: yes  Review and summarize past medical records: yes (Pt was admitted 10/2 - 10/4 for UTI, at discharge her Creatinine was 3.07.)    Patient Progress  Patient progress: stable         DIAGNOSIS  Final diagnoses:   Chronic kidney disease, unspecified CKD stage   Hyperkalemia       DISPOSITION  PT CARE TURNED OVER TO DR. MAGAN PLATA.    Latest Documented Vital Signs:  As of 11:04 PM  BP- (!) 210/94 HR- 67 Temp- 97.7 °F (36.5 °C) (Tympanic) O2 sat- 100%    --  Documentation assistance provided by pradeep Woodard for [employer] [place].  Information recorded by the scribe was done at my direction and has been verified and validated by me.          Kwame Woodard  11/08/18 2007       Otto Felipe PA  11/08/18 6224

## 2018-11-08 NOTE — ED NOTES
"Per EMS pt had labs drawn within the past week, she was told that pts kidney functions are abnormal. History of \"special needs\" and combativeness. Per family pt cannot verbally explain symptoms, pt hasn't complained of any urinary symptoms.      Savanna Arteaga RN  11/08/18 0879    "

## 2018-11-09 NOTE — ED PROVIDER NOTES
MD ATTESTATION NOTE    The COURT and I have discussed this patient's history, physical exam, and treatment plan.  I have reviewed the documentation and personally had a face to face interaction with the patient. I affirm the documentation and agree with the treatment and plan.  The attached note describes my personal findings.    Pt with MR presents with family for abnormal lab. Pt had previous blood work drawn after an episode of weakness. Pt family was informed today of pt's worsening kidney function. Pt has a hx of CKD, but is not on dialysis. Family reports that pt has had increased sleepiness.     GENERAL: not distressed  HENT: nares patent, moist MM  EYES: no scleral icterus  CV: regular rhythm, regular rate, no murmur  RESPIRATORY: normal effort, CTAB  ABDOMEN: soft and nontender  MUSCULOSKELETAL: no deformity  NEURO: alert, HENRIQUEZ, FC  SKIN: warm, dry    Vital signs and nursing notes reviewed.    Will get labs for further evaluation.    8:21 PM  Spoke with family about lab results showing pt's unchanged Cr of 3.07, but her hyperkalemia of 5.8. She is not on any known nephrotoxic drugs. Off losartan.  I spoke with family at length about dialysis and medical management of hyperkalemia including different therapeutic options. Family does not feel that pt would like to undergo dialysis due to poor quality of life. Family would like to be discharged. Pt is requesting discharge. Family (mother and sister) know that not treating this electrolyte abnormality could cause death in the near future. They are aware and in unanimous agreement with discharge home.     Documentation assistance provided by pradeep Contreras for Dr. Plata.  Information recorded by the scrtristane was done at my direction and has been verified and validated by me.       Collins Contreras  11/08/18 2026       Magan Plata II, MD  11/09/18 5918

## 2018-11-09 NOTE — ED TRIAGE NOTES
Was told by her dr that her kidneys were not working and she needed to call an ambulance and go to ER

## 2019-01-01 ENCOUNTER — HOSPITAL ENCOUNTER (INPATIENT)
Facility: HOSPITAL | Age: 59
LOS: 1 days | Discharge: HOSPICE | End: 2019-01-25
Attending: EMERGENCY MEDICINE | Admitting: INTERNAL MEDICINE

## 2019-01-01 ENCOUNTER — APPOINTMENT (OUTPATIENT)
Dept: GENERAL RADIOLOGY | Facility: HOSPITAL | Age: 59
End: 2019-01-01

## 2019-01-01 ENCOUNTER — HOSPITAL ENCOUNTER (INPATIENT)
Facility: HOSPITAL | Age: 59
LOS: 1 days | End: 2019-01-26
Attending: INTERNAL MEDICINE | Admitting: INTERNAL MEDICINE

## 2019-01-01 VITALS
BODY MASS INDEX: 52.41 KG/M2 | HEART RATE: 79 BPM | SYSTOLIC BLOOD PRESSURE: 84 MMHG | TEMPERATURE: 99.5 F | RESPIRATION RATE: 16 BRPM | DIASTOLIC BLOOD PRESSURE: 45 MMHG | HEIGHT: 59 IN | OXYGEN SATURATION: 97 % | WEIGHT: 260 LBS

## 2019-01-01 VITALS — DIASTOLIC BLOOD PRESSURE: 50 MMHG | TEMPERATURE: 97.7 F | SYSTOLIC BLOOD PRESSURE: 71 MMHG

## 2019-01-01 DIAGNOSIS — J69.0 ASPIRATION PNEUMONIA, UNSPECIFIED ASPIRATION PNEUMONIA TYPE, UNSPECIFIED LATERALITY, UNSPECIFIED PART OF LUNG (HCC): Primary | ICD-10-CM

## 2019-01-01 DIAGNOSIS — E87.5 HYPERKALEMIA: ICD-10-CM

## 2019-01-01 DIAGNOSIS — N39.0 URINARY TRACT INFECTION IN FEMALE: ICD-10-CM

## 2019-01-01 DIAGNOSIS — N17.9 ACUTE RENAL FAILURE SUPERIMPOSED ON CHRONIC KIDNEY DISEASE, UNSPECIFIED CKD STAGE, UNSPECIFIED ACUTE RENAL FAILURE TYPE (HCC): ICD-10-CM

## 2019-01-01 DIAGNOSIS — N18.9 ACUTE RENAL FAILURE SUPERIMPOSED ON CHRONIC KIDNEY DISEASE, UNSPECIFIED CKD STAGE, UNSPECIFIED ACUTE RENAL FAILURE TYPE (HCC): ICD-10-CM

## 2019-01-01 DIAGNOSIS — R73.9 HYPERGLYCEMIA: ICD-10-CM

## 2019-01-01 LAB
ALBUMIN SERPL-MCNC: 3 G/DL (ref 3.5–5.2)
ALBUMIN/GLOB SERPL: 0.7 G/DL
ALP SERPL-CCNC: 207 U/L (ref 39–117)
ALT SERPL W P-5'-P-CCNC: 13 U/L (ref 1–33)
ANION GAP SERPL CALCULATED.3IONS-SCNC: 14.6 MMOL/L
AST SERPL-CCNC: 9 U/L (ref 1–32)
B PARAPERT DNA SPEC QL NAA+PROBE: NOT DETECTED
B PERT DNA SPEC QL NAA+PROBE: NOT DETECTED
BACTERIA UR QL AUTO: ABNORMAL /HPF
BASOPHILS # BLD AUTO: 0.02 10*3/MM3 (ref 0–0.2)
BASOPHILS NFR BLD AUTO: 0.2 % (ref 0–1.5)
BILIRUB SERPL-MCNC: 0.3 MG/DL (ref 0.1–1.2)
BILIRUB UR QL STRIP: NEGATIVE
BUN BLD-MCNC: 78 MG/DL (ref 8–23)
BUN/CREAT SERPL: 17.3 (ref 7–25)
C PNEUM DNA NPH QL NAA+NON-PROBE: NOT DETECTED
CALCIUM SPEC-SCNC: 9.2 MG/DL (ref 8.6–10.5)
CHLORIDE SERPL-SCNC: 104 MMOL/L (ref 98–107)
CLARITY UR: ABNORMAL
CO2 SERPL-SCNC: 18.4 MMOL/L (ref 22–29)
COLOR UR: ABNORMAL
CREAT BLD-MCNC: 4.52 MG/DL (ref 0.57–1)
D-LACTATE SERPL-SCNC: 2.3 MMOL/L (ref 0.5–2)
DEPRECATED RDW RBC AUTO: 56.4 FL (ref 37–54)
EOSINOPHIL # BLD AUTO: 0.02 10*3/MM3 (ref 0–0.7)
EOSINOPHIL NFR BLD AUTO: 0.2 % (ref 0.3–6.2)
ERYTHROCYTE [DISTWIDTH] IN BLOOD BY AUTOMATED COUNT: 15.6 % (ref 11.7–13)
FLUAV H1 2009 PAND RNA NPH QL NAA+PROBE: NOT DETECTED
FLUAV H1 HA GENE NPH QL NAA+PROBE: NOT DETECTED
FLUAV H3 RNA NPH QL NAA+PROBE: NOT DETECTED
FLUAV SUBTYP SPEC NAA+PROBE: NOT DETECTED
FLUBV RNA ISLT QL NAA+PROBE: NOT DETECTED
GFR SERPL CREATININE-BSD FRML MDRD: 10 ML/MIN/1.73
GFR SERPL CREATININE-BSD FRML MDRD: ABNORMAL ML/MIN/1.73
GLOBULIN UR ELPH-MCNC: 4.2 GM/DL
GLUCOSE BLD-MCNC: 475 MG/DL (ref 65–99)
GLUCOSE BLDC GLUCOMTR-MCNC: 448 MG/DL (ref 70–130)
GLUCOSE BLDC GLUCOMTR-MCNC: 489 MG/DL (ref 70–130)
GLUCOSE UR STRIP-MCNC: ABNORMAL MG/DL
HADV DNA SPEC NAA+PROBE: NOT DETECTED
HCOV 229E RNA SPEC QL NAA+PROBE: NOT DETECTED
HCOV HKU1 RNA SPEC QL NAA+PROBE: NOT DETECTED
HCOV NL63 RNA SPEC QL NAA+PROBE: NOT DETECTED
HCOV OC43 RNA SPEC QL NAA+PROBE: NOT DETECTED
HCT VFR BLD AUTO: 31 % (ref 35.6–45.5)
HGB BLD-MCNC: 9.2 G/DL (ref 11.9–15.5)
HGB UR QL STRIP.AUTO: ABNORMAL
HMPV RNA NPH QL NAA+NON-PROBE: NOT DETECTED
HOLD SPECIMEN: NORMAL
HPIV1 RNA SPEC QL NAA+PROBE: NOT DETECTED
HPIV2 RNA SPEC QL NAA+PROBE: NOT DETECTED
HPIV3 RNA NPH QL NAA+PROBE: NOT DETECTED
HPIV4 P GENE NPH QL NAA+PROBE: NOT DETECTED
HYALINE CASTS UR QL AUTO: ABNORMAL /LPF
IMM GRANULOCYTES # BLD AUTO: 0.12 10*3/MM3 (ref 0–0.03)
IMM GRANULOCYTES NFR BLD AUTO: 1.2 % (ref 0–0.5)
KETONES UR QL STRIP: NEGATIVE
LEUKOCYTE ESTERASE UR QL STRIP.AUTO: ABNORMAL
LYMPHOCYTES # BLD AUTO: 1.03 10*3/MM3 (ref 0.9–4.8)
LYMPHOCYTES NFR BLD AUTO: 10.3 % (ref 19.6–45.3)
M PNEUMO IGG SER IA-ACNC: NOT DETECTED
MCH RBC QN AUTO: 29.4 PG (ref 26.9–32)
MCHC RBC AUTO-ENTMCNC: 29.7 G/DL (ref 32.4–36.3)
MCV RBC AUTO: 99 FL (ref 80.5–98.2)
MONOCYTES # BLD AUTO: 1.27 10*3/MM3 (ref 0.2–1.2)
MONOCYTES NFR BLD AUTO: 12.7 % (ref 5–12)
NEUTROPHILS # BLD AUTO: 7.53 10*3/MM3 (ref 1.9–8.1)
NEUTROPHILS NFR BLD AUTO: 75.4 % (ref 42.7–76)
NITRITE UR QL STRIP: POSITIVE
PH UR STRIP.AUTO: <=5 [PH] (ref 5–8)
PLAT MORPH BLD: NORMAL
PLATELET # BLD AUTO: 214 10*3/MM3 (ref 140–500)
PMV BLD AUTO: 9 FL (ref 6–12)
POTASSIUM BLD-SCNC: 5.7 MMOL/L (ref 3.5–5.2)
PROT SERPL-MCNC: 7.2 G/DL (ref 6–8.5)
PROT UR QL STRIP: ABNORMAL
RBC # BLD AUTO: 3.13 10*6/MM3 (ref 3.9–5.2)
RBC # UR: ABNORMAL /HPF
RBC MORPH BLD: NORMAL
REF LAB TEST METHOD: ABNORMAL
RHINOVIRUS RNA SPEC NAA+PROBE: NOT DETECTED
RSV RNA NPH QL NAA+NON-PROBE: NOT DETECTED
SODIUM BLD-SCNC: 137 MMOL/L (ref 136–145)
SP GR UR STRIP: 1.03 (ref 1–1.03)
SQUAMOUS #/AREA URNS HPF: ABNORMAL /HPF
UROBILINOGEN UR QL STRIP: ABNORMAL
WBC MORPH BLD: NORMAL
WBC NRBC COR # BLD: 9.99 10*3/MM3 (ref 4.5–10.7)
WBC UR QL AUTO: ABNORMAL /HPF
WHOLE BLOOD HOLD SPECIMEN: NORMAL
WHOLE BLOOD HOLD SPECIMEN: NORMAL

## 2019-01-01 PROCEDURE — 25010000002 LORAZEPAM PER 2 MG: Performed by: INTERNAL MEDICINE

## 2019-01-01 PROCEDURE — 25010000002 PIPERACILLIN SOD-TAZOBACTAM PER 1 G: Performed by: NURSE PRACTITIONER

## 2019-01-01 PROCEDURE — 99291 CRITICAL CARE FIRST HOUR: CPT

## 2019-01-01 PROCEDURE — 94640 AIRWAY INHALATION TREATMENT: CPT

## 2019-01-01 PROCEDURE — 93010 ELECTROCARDIOGRAM REPORT: CPT | Performed by: INTERNAL MEDICINE

## 2019-01-01 PROCEDURE — 93005 ELECTROCARDIOGRAM TRACING: CPT | Performed by: NURSE PRACTITIONER

## 2019-01-01 PROCEDURE — 87581 M.PNEUMON DNA AMP PROBE: CPT | Performed by: NURSE PRACTITIONER

## 2019-01-01 PROCEDURE — 25010000002 MORPHINE (PF) 10 MG/ML SOLUTION: Performed by: INTERNAL MEDICINE

## 2019-01-01 PROCEDURE — 81001 URINALYSIS AUTO W/SCOPE: CPT | Performed by: NURSE PRACTITIONER

## 2019-01-01 PROCEDURE — 87040 BLOOD CULTURE FOR BACTERIA: CPT | Performed by: NURSE PRACTITIONER

## 2019-01-01 PROCEDURE — 25010000002 LORAZEPAM PER 2 MG: Performed by: NURSE PRACTITIONER

## 2019-01-01 PROCEDURE — 99222 1ST HOSP IP/OBS MODERATE 55: CPT | Performed by: INTERNAL MEDICINE

## 2019-01-01 PROCEDURE — 99239 HOSP IP/OBS DSCHRG MGMT >30: CPT | Performed by: INTERNAL MEDICINE

## 2019-01-01 PROCEDURE — 85025 COMPLETE CBC W/AUTO DIFF WBC: CPT | Performed by: NURSE PRACTITIONER

## 2019-01-01 PROCEDURE — 82962 GLUCOSE BLOOD TEST: CPT

## 2019-01-01 PROCEDURE — 83605 ASSAY OF LACTIC ACID: CPT | Performed by: NURSE PRACTITIONER

## 2019-01-01 PROCEDURE — 87798 DETECT AGENT NOS DNA AMP: CPT | Performed by: NURSE PRACTITIONER

## 2019-01-01 PROCEDURE — 80053 COMPREHEN METABOLIC PANEL: CPT | Performed by: NURSE PRACTITIONER

## 2019-01-01 PROCEDURE — 85007 BL SMEAR W/DIFF WBC COUNT: CPT | Performed by: NURSE PRACTITIONER

## 2019-01-01 PROCEDURE — 63710000001 INSULIN REGULAR HUMAN PER 5 UNITS: Performed by: NURSE PRACTITIONER

## 2019-01-01 PROCEDURE — 87486 CHLMYD PNEUM DNA AMP PROBE: CPT | Performed by: NURSE PRACTITIONER

## 2019-01-01 PROCEDURE — 87633 RESP VIRUS 12-25 TARGETS: CPT | Performed by: NURSE PRACTITIONER

## 2019-01-01 PROCEDURE — 71046 X-RAY EXAM CHEST 2 VIEWS: CPT

## 2019-01-01 RX ORDER — DIPHENOXYLATE HYDROCHLORIDE AND ATROPINE SULFATE 2.5; .025 MG/1; MG/1
1 TABLET ORAL
Status: DISCONTINUED | OUTPATIENT
Start: 2019-01-01 | End: 2019-01-01

## 2019-01-01 RX ORDER — LORAZEPAM 2 MG/ML
0.5 CONCENTRATE ORAL
Status: DISCONTINUED | OUTPATIENT
Start: 2019-01-01 | End: 2019-01-01 | Stop reason: HOSPADM

## 2019-01-01 RX ORDER — ACETAMINOPHEN 325 MG/1
650 TABLET ORAL EVERY 4 HOURS PRN
Status: DISCONTINUED | OUTPATIENT
Start: 2019-01-01 | End: 2019-01-01 | Stop reason: HOSPADM

## 2019-01-01 RX ORDER — LORAZEPAM 2 MG/ML
0.5 INJECTION INTRAMUSCULAR
Status: DISCONTINUED | OUTPATIENT
Start: 2019-01-01 | End: 2019-01-01 | Stop reason: HOSPADM

## 2019-01-01 RX ORDER — GLYCOPYRROLATE 0.2 MG/ML
0.4 INJECTION INTRAMUSCULAR; INTRAVENOUS
Status: DISCONTINUED | OUTPATIENT
Start: 2019-01-01 | End: 2019-01-01 | Stop reason: HOSPADM

## 2019-01-01 RX ORDER — ESOMEPRAZOLE MAGNESIUM 40 MG/1
40 CAPSULE, DELAYED RELEASE ORAL
Status: ON HOLD | COMMUNITY
End: 2019-01-01

## 2019-01-01 RX ORDER — ALBUTEROL SULFATE 2.5 MG/3ML
2.5 SOLUTION RESPIRATORY (INHALATION) ONCE
Status: COMPLETED | OUTPATIENT
Start: 2019-01-01 | End: 2019-01-01

## 2019-01-01 RX ORDER — SODIUM CHLORIDE 0.9 % (FLUSH) 0.9 %
10 SYRINGE (ML) INJECTION AS NEEDED
Status: DISCONTINUED | OUTPATIENT
Start: 2019-01-01 | End: 2019-01-01 | Stop reason: HOSPADM

## 2019-01-01 RX ORDER — MORPHINE SULFATE 20 MG/ML
10 SOLUTION ORAL
Status: DISCONTINUED | OUTPATIENT
Start: 2019-01-01 | End: 2019-01-01 | Stop reason: HOSPADM

## 2019-01-01 RX ORDER — MORPHINE SULFATE 2 MG/ML
4 INJECTION, SOLUTION INTRAMUSCULAR; INTRAVENOUS
Status: CANCELLED | OUTPATIENT
Start: 2019-01-01 | End: 2019-02-03

## 2019-01-01 RX ORDER — MORPHINE SULFATE 2 MG/ML
4 INJECTION, SOLUTION INTRAMUSCULAR; INTRAVENOUS
Status: DISCONTINUED | OUTPATIENT
Start: 2019-01-01 | End: 2019-01-01 | Stop reason: HOSPADM

## 2019-01-01 RX ORDER — MORPHINE SULFATE 2 MG/ML
2 INJECTION, SOLUTION INTRAMUSCULAR; INTRAVENOUS
Status: DISCONTINUED | OUTPATIENT
Start: 2019-01-01 | End: 2019-01-01 | Stop reason: HOSPADM

## 2019-01-01 RX ORDER — GLYCOPYRROLATE 0.2 MG/ML
0.4 INJECTION INTRAMUSCULAR; INTRAVENOUS
Status: CANCELLED | OUTPATIENT
Start: 2019-01-01

## 2019-01-01 RX ORDER — SODIUM CHLORIDE 0.9 % (FLUSH) 0.9 %
10 SYRINGE (ML) INJECTION AS NEEDED
Status: CANCELLED | OUTPATIENT
Start: 2019-01-01

## 2019-01-01 RX ORDER — MORPHINE SULFATE 20 MG/ML
20 SOLUTION ORAL
Status: DISCONTINUED | OUTPATIENT
Start: 2019-01-01 | End: 2019-01-01 | Stop reason: HOSPADM

## 2019-01-01 RX ORDER — MORPHINE SULFATE 2 MG/ML
2 INJECTION, SOLUTION INTRAMUSCULAR; INTRAVENOUS
Status: CANCELLED | OUTPATIENT
Start: 2019-01-01 | End: 2019-02-03

## 2019-01-01 RX ORDER — LORAZEPAM 2 MG/ML
2 INJECTION INTRAMUSCULAR
Status: DISCONTINUED | OUTPATIENT
Start: 2019-01-01 | End: 2019-01-01 | Stop reason: HOSPADM

## 2019-01-01 RX ORDER — MORPHINE SULFATE 10 MG/ML
6 INJECTION INTRAMUSCULAR; INTRAVENOUS; SUBCUTANEOUS
Status: DISCONTINUED | OUTPATIENT
Start: 2019-01-01 | End: 2019-01-01 | Stop reason: HOSPADM

## 2019-01-01 RX ORDER — LORAZEPAM 2 MG/ML
2 CONCENTRATE ORAL
Status: CANCELLED | OUTPATIENT
Start: 2019-01-01 | End: 2019-02-03

## 2019-01-01 RX ORDER — ACETAMINOPHEN 160 MG/5ML
650 SOLUTION ORAL EVERY 4 HOURS PRN
Status: DISCONTINUED | OUTPATIENT
Start: 2019-01-01 | End: 2019-01-01 | Stop reason: HOSPADM

## 2019-01-01 RX ORDER — ONDANSETRON 2 MG/ML
4 INJECTION INTRAMUSCULAR; INTRAVENOUS EVERY 6 HOURS PRN
Status: DISCONTINUED | OUTPATIENT
Start: 2019-01-01 | End: 2019-01-01 | Stop reason: HOSPADM

## 2019-01-01 RX ORDER — ACETAMINOPHEN 160 MG/5ML
650 SOLUTION ORAL EVERY 4 HOURS PRN
Status: CANCELLED | OUTPATIENT
Start: 2019-01-01

## 2019-01-01 RX ORDER — TRAMADOL HYDROCHLORIDE 50 MG/1
50 TABLET ORAL EVERY 6 HOURS PRN
Status: ON HOLD | COMMUNITY
End: 2019-01-01

## 2019-01-01 RX ORDER — LORAZEPAM 2 MG/ML
1 CONCENTRATE ORAL
Status: DISCONTINUED | OUTPATIENT
Start: 2019-01-01 | End: 2019-01-01 | Stop reason: HOSPADM

## 2019-01-01 RX ORDER — LORAZEPAM 2 MG/ML
0.5 CONCENTRATE ORAL
Status: CANCELLED | OUTPATIENT
Start: 2019-01-01 | End: 2019-02-03

## 2019-01-01 RX ORDER — SCOLOPAMINE TRANSDERMAL SYSTEM 1 MG/1
1 PATCH, EXTENDED RELEASE TRANSDERMAL
Status: DISCONTINUED | OUTPATIENT
Start: 2019-01-01 | End: 2019-01-01 | Stop reason: HOSPADM

## 2019-01-01 RX ORDER — ACETAMINOPHEN 325 MG/1
650 TABLET ORAL EVERY 4 HOURS PRN
Status: CANCELLED | OUTPATIENT
Start: 2019-01-01

## 2019-01-01 RX ORDER — ACETAMINOPHEN 650 MG/1
650 SUPPOSITORY RECTAL EVERY 4 HOURS PRN
Status: CANCELLED | OUTPATIENT
Start: 2019-01-01

## 2019-01-01 RX ORDER — GLYCOPYRROLATE 0.2 MG/ML
0.2 INJECTION INTRAMUSCULAR; INTRAVENOUS
Status: DISCONTINUED | OUTPATIENT
Start: 2019-01-01 | End: 2019-01-01

## 2019-01-01 RX ORDER — LORAZEPAM 2 MG/ML
0.5 INJECTION INTRAMUSCULAR
Status: CANCELLED | OUTPATIENT
Start: 2019-01-01 | End: 2019-02-03

## 2019-01-01 RX ORDER — SCOLOPAMINE TRANSDERMAL SYSTEM 1 MG/1
1 PATCH, EXTENDED RELEASE TRANSDERMAL
Status: CANCELLED | OUTPATIENT
Start: 2019-01-01

## 2019-01-01 RX ORDER — LORAZEPAM 2 MG/ML
1 INJECTION INTRAMUSCULAR
Status: DISCONTINUED | OUTPATIENT
Start: 2019-01-01 | End: 2019-01-01 | Stop reason: HOSPADM

## 2019-01-01 RX ORDER — GLYCOPYRROLATE 0.2 MG/ML
0.8 INJECTION INTRAMUSCULAR; INTRAVENOUS
Status: DISCONTINUED | OUTPATIENT
Start: 2019-01-01 | End: 2019-01-01 | Stop reason: HOSPADM

## 2019-01-01 RX ORDER — LORAZEPAM 2 MG/ML
0.5 INJECTION INTRAMUSCULAR ONCE
Status: COMPLETED | OUTPATIENT
Start: 2019-01-01 | End: 2019-01-01

## 2019-01-01 RX ORDER — MORPHINE SULFATE 20 MG/ML
5 SOLUTION ORAL
Status: DISCONTINUED | OUTPATIENT
Start: 2019-01-01 | End: 2019-01-01 | Stop reason: HOSPADM

## 2019-01-01 RX ORDER — LORAZEPAM 2 MG/ML
2 CONCENTRATE ORAL
Status: DISCONTINUED | OUTPATIENT
Start: 2019-01-01 | End: 2019-01-01 | Stop reason: HOSPADM

## 2019-01-01 RX ORDER — GLYCOPYRROLATE 0.2 MG/ML
0.8 INJECTION INTRAMUSCULAR; INTRAVENOUS
Status: CANCELLED | OUTPATIENT
Start: 2019-01-01

## 2019-01-01 RX ORDER — GLYCOPYRROLATE 0.2 MG/ML
0.4 INJECTION INTRAMUSCULAR; INTRAVENOUS
Status: DISCONTINUED | OUTPATIENT
Start: 2019-01-01 | End: 2019-01-01

## 2019-01-01 RX ORDER — ONDANSETRON 2 MG/ML
4 INJECTION INTRAMUSCULAR; INTRAVENOUS EVERY 6 HOURS PRN
Status: CANCELLED | OUTPATIENT
Start: 2019-01-01

## 2019-01-01 RX ORDER — LORAZEPAM 2 MG/ML
1 CONCENTRATE ORAL
Status: CANCELLED | OUTPATIENT
Start: 2019-01-01 | End: 2019-02-03

## 2019-01-01 RX ORDER — MORPHINE SULFATE 20 MG/ML
10 SOLUTION ORAL
Status: CANCELLED | OUTPATIENT
Start: 2019-01-01 | End: 2019-02-03

## 2019-01-01 RX ORDER — PETROLATUM 42 G/100G
OINTMENT TOPICAL
Status: CANCELLED | OUTPATIENT
Start: 2019-01-01

## 2019-01-01 RX ORDER — PETROLATUM 42 G/100G
OINTMENT TOPICAL
Status: DISCONTINUED | OUTPATIENT
Start: 2019-01-01 | End: 2019-01-01 | Stop reason: HOSPADM

## 2019-01-01 RX ORDER — LOSARTAN POTASSIUM 100 MG/1
100 TABLET ORAL DAILY
Status: ON HOLD | COMMUNITY
End: 2019-01-01

## 2019-01-01 RX ORDER — MORPHINE SULFATE 20 MG/ML
20 SOLUTION ORAL
Status: CANCELLED | OUTPATIENT
Start: 2019-01-01 | End: 2019-02-03

## 2019-01-01 RX ORDER — ACETAMINOPHEN 650 MG/1
650 SUPPOSITORY RECTAL EVERY 4 HOURS PRN
Status: DISCONTINUED | OUTPATIENT
Start: 2019-01-01 | End: 2019-01-01 | Stop reason: HOSPADM

## 2019-01-01 RX ORDER — MORPHINE SULFATE 10 MG/ML
6 INJECTION INTRAMUSCULAR; INTRAVENOUS; SUBCUTANEOUS
Status: CANCELLED | OUTPATIENT
Start: 2019-01-01 | End: 2019-02-03

## 2019-01-01 RX ORDER — LORAZEPAM 2 MG/ML
1 INJECTION INTRAMUSCULAR
Status: CANCELLED | OUTPATIENT
Start: 2019-01-01 | End: 2019-02-03

## 2019-01-01 RX ORDER — LORAZEPAM 2 MG/ML
2 INJECTION INTRAMUSCULAR
Status: CANCELLED | OUTPATIENT
Start: 2019-01-01 | End: 2019-02-03

## 2019-01-01 RX ORDER — MORPHINE SULFATE 20 MG/ML
5 SOLUTION ORAL
Status: CANCELLED | OUTPATIENT
Start: 2019-01-01 | End: 2019-02-03

## 2019-01-01 RX ADMIN — LORAZEPAM 0.5 MG: 2 INJECTION INTRAMUSCULAR; INTRAVENOUS at 09:46

## 2019-01-01 RX ADMIN — GLYCOPYRROLATE 0.8 MG: 0.2 INJECTION INTRAMUSCULAR; INTRAVENOUS at 04:54

## 2019-01-01 RX ADMIN — LORAZEPAM 0.5 MG: 2 INJECTION INTRAMUSCULAR; INTRAVENOUS at 12:01

## 2019-01-01 RX ADMIN — MORPHINE SULFATE 2 MG: 10 INJECTION INTRAVENOUS at 00:47

## 2019-01-01 RX ADMIN — MORPHINE SULFATE 2 MG: 10 INJECTION INTRAVENOUS at 17:26

## 2019-01-01 RX ADMIN — TAZOBACTAM SODIUM AND PIPERACILLIN SODIUM 4.5 G: 500; 4 INJECTION, SOLUTION INTRAVENOUS at 14:18

## 2019-01-01 RX ADMIN — GLYCOPYRROLATE 0.4 MG: 0.2 INJECTION, SOLUTION INTRAMUSCULAR; INTRAVENOUS at 09:21

## 2019-01-01 RX ADMIN — MORPHINE SULFATE 2 MG: 10 INJECTION INTRAVENOUS at 09:20

## 2019-01-01 RX ADMIN — SODIUM CHLORIDE 500 ML: 9 INJECTION, SOLUTION INTRAVENOUS at 12:03

## 2019-01-01 RX ADMIN — INSULIN HUMAN 10 UNITS: 100 INJECTION, SOLUTION PARENTERAL at 14:53

## 2019-01-01 RX ADMIN — MORPHINE SULFATE 2 MG: 10 INJECTION INTRAVENOUS at 01:23

## 2019-01-01 RX ADMIN — MORPHINE SULFATE 2 MG: 10 INJECTION INTRAVENOUS at 04:52

## 2019-01-01 RX ADMIN — INSULIN HUMAN 10 UNITS: 100 INJECTION, SOLUTION PARENTERAL at 12:04

## 2019-01-01 RX ADMIN — GLYCOPYRROLATE 0.4 MG: 0.2 INJECTION, SOLUTION INTRAMUSCULAR; INTRAVENOUS at 00:46

## 2019-01-01 RX ADMIN — PETROLATUM: 42 OINTMENT TOPICAL at 17:26

## 2019-01-01 RX ADMIN — MORPHINE SULFATE 2 MG: 10 INJECTION INTRAVENOUS at 20:53

## 2019-01-01 RX ADMIN — ALBUTEROL SULFATE 2.5 MG: 2.5 SOLUTION RESPIRATORY (INHALATION) at 11:56

## 2019-01-01 RX ADMIN — GLYCOPYRROLATE 0.4 MG: 0.2 INJECTION, SOLUTION INTRAMUSCULAR; INTRAVENOUS at 13:24

## 2019-01-01 RX ADMIN — GLYCOPYRROLATE 0.4 MG: 0.2 INJECTION, SOLUTION INTRAMUSCULAR; INTRAVENOUS at 04:52

## 2019-01-01 RX ADMIN — MORPHINE SULFATE 4 MG: 10 INJECTION INTRAVENOUS at 04:55

## 2019-01-01 RX ADMIN — MORPHINE SULFATE 2 MG: 10 INJECTION INTRAVENOUS at 19:12

## 2019-01-01 RX ADMIN — GLYCOPYRROLATE 0.4 MG: 0.2 INJECTION, SOLUTION INTRAMUSCULAR; INTRAVENOUS at 21:00

## 2019-01-01 RX ADMIN — MORPHINE SULFATE 2 MG: 10 INJECTION INTRAVENOUS at 13:24

## 2019-01-01 RX ADMIN — GLYCOPYRROLATE 0.8 MG: 0.2 INJECTION INTRAMUSCULAR; INTRAVENOUS at 01:23

## 2019-01-01 RX ADMIN — MORPHINE SULFATE 2 MG: 10 INJECTION INTRAVENOUS at 21:00

## 2019-01-01 RX ADMIN — SCOPOLAMINE 1 PATCH: 1 PATCH, EXTENDED RELEASE TRANSDERMAL at 17:26

## 2019-01-01 RX ADMIN — GLYCOPYRROLATE 0.8 MG: 0.2 INJECTION, SOLUTION INTRAMUSCULAR; INTRAVENOUS at 17:26

## 2019-01-01 RX ADMIN — GLYCOPYRROLATE 0.8 MG: 0.2 INJECTION INTRAMUSCULAR; INTRAVENOUS at 20:54

## 2019-01-01 RX ADMIN — LORAZEPAM 0.5 MG: 2 INJECTION INTRAMUSCULAR; INTRAVENOUS at 19:12

## 2019-01-24 PROBLEM — I27.20 PULMONARY HYPERTENSION (HCC): Status: ACTIVE | Noted: 2019-01-01

## 2019-01-24 PROBLEM — J18.9 LINGULAR PNEUMONIA: Status: ACTIVE | Noted: 2019-01-01

## 2019-01-24 PROBLEM — G47.33 OSA (OBSTRUCTIVE SLEEP APNEA): Status: ACTIVE | Noted: 2019-01-01

## 2019-01-24 PROBLEM — J69.0 ASPIRATION PNEUMONIA (HCC): Status: ACTIVE | Noted: 2019-01-01

## 2019-01-24 PROBLEM — R60.0 EDEMA OF BOTH LOWER EXTREMITIES: Status: ACTIVE | Noted: 2019-01-01

## 2019-01-24 PROBLEM — I87.2 CHRONIC VENOUS STASIS DERMATITIS OF BOTH LOWER EXTREMITIES: Status: ACTIVE | Noted: 2019-01-01

## 2019-01-24 NOTE — PROGRESS NOTES
"Pharmacy Consult - Piperacillin/tazobactam Dosing  Virginie Damon is to be given one dose of pharmacy to dose for PNA.  Pharmacy dosing per Jennifer Stone's request.   Admit date: 1/24/2019 10:49 AM    Relevant clinical data and objective history reviewed:  58 y.o. female 149.9 cm (59\") 118 kg (260 lb)    Past Medical History:   Diagnosis Date   • CHF (congestive heart failure) (CMS/[facility])    • Diabetes mellitus (CMS/[facility])    • Hypertension    • Mental retardation    • Renal disorder      Creatinine   Date Value Ref Range Status   01/24/2019 4.52 (H) 0.57 - 1.00 mg/dL Final     BUN   Date Value Ref Range Status   01/24/2019 78 (H) 8 - 23 mg/dL Final     Estimated Creatinine Clearance: 16 mL/min (A) (by C-G formula based on SCr of 4.52 mg/dL (H)).    Lab Results   Component Value Date    WBC 9.99 01/24/2019    WBC 11.03 (H) 11/08/2018    WBC 8.88 10/04/2018     Temp Readings from Last 3 Encounters:   01/24/19 99.1 °F (37.3 °C) (Tympanic)   11/08/18 97.7 °F (36.5 °C) (Tympanic)   10/04/18 98.2 °F (36.8 °C) (Oral)     Baseline cultures/source/suscetibilit/labs/radiology:  1/24 WBC WNL  1/24 RVP negative  1/24 Blood Cx (2/2) In process    Anti-Infectives (From admission, onward)    Ordered     Dose/Rate Route Frequency Start Stop    01/24/19 1411  piperacillin-tazobactam (ZOSYN) 4.5 g in iso-osmotic dextrose 100 mL IVPB (premix)     Ordering Provider:  Jennifer Stone APRN    4.5 g  over 30 Minutes Intravenous Once 01/24/19 1413      01/24/19 1403  Pharmacy to Dose Zosyn     Ordering Provider:  Jennifer Stone APRN     Does not apply Once 01/24/19 1405               Assessment/Plan  1. Will go ahead and give piperacillin/tazobactam 4.5 gm iv x1 dose. If continuing piperacillin/tazobactam upon admission, recommend starting piperacillin/tazobactam 4.5 g IV Q12H given the patient's CrCl < 20 mL/min.  2. Will monitor renal function and pharmacy will continue to follow daily and adjust as needed. "     Thank you,    Jani Turcios, PharmD, BCPS  01/24/19 2:15 PM

## 2019-01-24 NOTE — H&P
"Palliative Care/Hospice Admit/Consult Note       Referring Provider: Jennifer Stone APRN  Reason for Consultation: Palliative/Hospice Care  Date of Admission:  1/24/2019    Patient Care Team:  Nelly Kaur APRN as PCP - General (Family Medicine)  Damián House MD as Consulting Physician (Hospice and Palliative Medicine)    Chief complaint:  ARF/CKD3    History of present illness:  The patient is a 58 y.o. female who has hx of intellectual disability, diabetes, and CKD, lives at home with her mother, and has caretakers. Mother reports that about 2 days ago, pt had vomiting. Since then, pt has had decreased PO intake, cough, and dyspnea. Mother notes that pt has also had generalized weakness for about 2 weeks. Mother has not noticed choking episodes, fever, urinary output changes, and diarrhea. Per EMS, pt's blood glucose read \"high\" on scene but pt did not take her insulin this morning. At baseline, pt is not on any supplemental O2. Mother reports that there has been discussion about possibly initiating dialysis but mother does not think that pt would like to undergo dialysis.     ED workup with Glu 475 and worsening BUN/Creat 44/3.07 from 11/8/2018 to 78/4.52 today. Ligular pneumonia suggested with a normal WBC and no left shift. However, lactic acid elevated at 2.3.    At the time of my exam, the patient initially tried to push me away. No ROS obtainable from the patient. I talked with the patient's mother and family.    Review of Systems  Pertinent items are noted in HPI         History  Past Medical History:   Diagnosis Date   • CHF (congestive heart failure) (CMS/HCC)    • Diabetes mellitus (CMS/HCC)    • Hypertension    • Mental retardation    • Renal disorder    ,   Past Surgical History:   Procedure Laterality Date   • HERNIA REPAIR     , History reviewed. No pertinent family history. and   Social History     Tobacco Use   • Smoking status: Never Smoker   • Smokeless tobacco: Never Used "   Substance Use Topics   • Alcohol use: No   • Drug use: No       Vital Signs   Temp:  [99.1 °F (37.3 °C)] 99.1 °F (37.3 °C)  Heart Rate:  [71-81] 71  Resp:  [14-24] 18  BP: (111-163)/(56-87) 128/60  Device (Oxygen Therapy): nasal cannulaFlow (L/min):  [2] 2SpO2:  [92 %-99 %] 98 %    Physical Exam:     General Appearance:    Awake but not alert or cummicating and appears in no acute distress   Head:    Normocephalic, without obvious abnormality, atraumatic   Eyes:            Lids and lashes normal, conjunctivae and sclerae normal, no   icterus   Ears:    Ears appear intact with no abnormalities noted   Throat:   No oral lesions, oral mucosa moist   Neck:   No adenopathy, supple, trachea midline, no thyromegaly   Back:     No scoliosis present   Lungs:     Clear to auscultation, occasional upper rhonchi, respirations regular and slightly labored    Heart:    Regular rhythm and normal rate   Breast Exam:    Deferred   Abdomen:     Obese and occasional bowel sounds, soft and non-tender, non-distended   Genitalia:    Deferred   Extremities:   Large with edema and chronic venous stasis changes , no cyanosis   Pulses:   Radial pulses palpable and equal bilaterally   Skin:   No bleeding       Neurologic:   Not follow commands to test       Results Review:   I reviewed the patient's new clinical results.      Impression:      ARF (acute renal failure) (CMS/[facility])    Aspiration pneumonia (CMS/[facility])    Lingular pneumonia    Mental retardation    CKD (chronic kidney disease) stage 3, GFR 30-59 ml/min (CMS/[facility])    Type 2 diabetes mellitus with renal complication (CMS/[facility])    ALIDA (obstructive sleep apnea); suspected    Pulmonary hypertension (CMS/[facility])    Chronic venous stasis dermatitis of both lower extremities    Edema of both lower extremities    HTN (hypertension)        Plan:  I reviewed all with the family. The family has decided no HD for the patient. They understand her poor condition. Therefore, they want comfort care.  They do not want antibiotics and they do not want to treat elevated BGs. They wand symptoms treated for comfort. No CPR and comfort measures. After reviewing tomorrow, I will have [facility] evaluate for HSB. I answered the family's questions.      Damián House MD  Hospice and Palliative Medicine  01/24/19  6:20 PM

## 2019-01-24 NOTE — ED PROVIDER NOTES
"  EMERGENCY DEPARTMENT ENCOUNTER    CHIEF COMPLAINT  Chief Complaint: dyspnea  History given by: family (mother), EMS  History limited by: intellectual disability  Time Seen: 1124  Room Number: 26/26  PMD: Nelly Kaur, GABINO      HPI:  History obtained from mother and EMS. Pt is a 58 y.o. female who has hx of intellectual disability, diabetes, and CKD, lives at home with her mother, and has caretakers. Mother reports that about 2 days ago, pt had vomiting. Since then, pt has had decreased PO intake, cough, and dyspnea. Mother notes that pt has also had generalized weakness for about 2 weeks. Mother has not noticed choking episodes, fever, urinary output changes, and diarrhea. Per EMS, pt's blood glucose read \"high\" on scene but pt did not take her insulin this morning. At baseline, pt is not on any supplemental O2. Mother reports that there has been discussion about possibly initiating dialysis but mother does not think that pt would like to undergo dialysis. Past Medical History of intellectual disability, diabetes, CKD, and CHF.     Duration: within the past 2 days  Timing: unknown  Location: respiratory  Radiation: unknown  Quality: unknown  Intensity/Severity: moderate  Progression: unknown  Associated Symptoms: vomiting (occurred about 2 days ago), decreased PO intake, cough, generalized weakness (started about 2 weeks ago)  Aggravating Factors: unknown  Alleviating Factors: unknown  Previous Episodes: unknown  Treatment before arrival: none mentioned    PAST MEDICAL HISTORY  Active Ambulatory Problems     Diagnosis Date Noted   • UTI (urinary tract infection) 10/03/2018   • Mental retardation 10/03/2018   • CKD (chronic kidney disease) stage 3, GFR 30-59 ml/min (CMS/[facility]) 10/03/2018   • ARF (acute renal failure) (CMS/[facility]) 10/03/2018   • HTN (hypertension) 10/03/2018   • Type 2 diabetes mellitus with renal complication (CMS/[facility]) 10/03/2018     Resolved Ambulatory Problems     Diagnosis Date Noted   • " Encephalopathy 10/03/2018   • Hypernatremia 10/03/2018     Past Medical History:   Diagnosis Date   • CHF (congestive heart failure) (CMS/[facility])    • Diabetes mellitus (CMS/[facility])    • Hypertension    • Mental retardation    • Renal disorder        PAST SURGICAL HISTORY  Past Surgical History:   Procedure Laterality Date   • HERNIA REPAIR         FAMILY HISTORY  History reviewed. No pertinent family history.    SOCIAL HISTORY  Social History     Socioeconomic History   • Marital status: Single     Spouse name: Not on file   • Number of children: Not on file   • Years of education: Not on file   • Highest education level: Not on file   Social Needs   • Financial resource strain: Not on file   • Food insecurity - worry: Not on file   • Food insecurity - inability: Not on file   • Transportation needs - medical: Not on file   • Transportation needs - non-medical: Not on file   Occupational History   • Not on file   Tobacco Use   • Smoking status: Never Smoker   • Smokeless tobacco: Never Used   Substance and Sexual Activity   • Alcohol use: No   • Drug use: No   • Sexual activity: Defer   Other Topics Concern   • Not on file   Social History Narrative   • Not on file         ALLERGIES  Patient has no known allergies.    REVIEW OF SYSTEMS  Review of Systems   Unable to perform ROS: Other (intellectual disability)   Constitutional: Negative for fever.        Decreased PO intake   Respiratory: Positive for cough and shortness of breath. Negative for choking.    Gastrointestinal: Positive for vomiting (occurred about 2 days ago). Negative for diarrhea.   Genitourinary: Negative for decreased urine volume.   Neurological: Positive for weakness (generalized weakness (started about 2 weeks ago)).       PHYSICAL EXAM  ED Triage Vitals   Temp Heart Rate Resp BP SpO2   01/24/19 1050 01/24/19 1048 01/24/19 1048 01/24/19 1048 01/24/19 1048   99.1 °F (37.3 °C) 81 14 147/70 98 % WNL       Physical Exam   Constitutional: No distress.    Exam limited by intellectual disability    chronically ill appearing   HENT:   Head: Atraumatic.   Mouth/Throat: Mucous membranes are dry.   Eyes: EOM are normal.   Neck: Normal range of motion. Neck supple.   Cardiovascular: Normal rate, regular rhythm and normal heart sounds.   Pulmonary/Chest: Effort normal. No respiratory distress. She has no wheezes. She has rhonchi. She has no rales.   Abdominal: Soft. There is no tenderness. There is no rebound and no guarding.   Musculoskeletal: She exhibits edema (3+ BLE edema).   Neurological:   Drowsy, appears generally weak, neuro exam otherwise limited by intellectual disability   Skin: Skin is warm and dry. There is pallor.   Nursing note and vitals reviewed.      LAB RESULTS  Recent Results (from the past 24 hour(s))   POC Glucose Once    Collection Time: 01/24/19 11:19 AM   Result Value Ref Range    Glucose 448 (H) 70 - 130 mg/dL   Comprehensive Metabolic Panel    Collection Time: 01/24/19 11:54 AM   Result Value Ref Range    Glucose 475 (C) 65 - 99 mg/dL    BUN 78 (H) 8 - 23 mg/dL    Creatinine 4.52 (H) 0.57 - 1.00 mg/dL    Sodium 137 136 - 145 mmol/L    Potassium 5.7 (H) 3.5 - 5.2 mmol/L    Chloride 104 98 - 107 mmol/L    CO2 18.4 (L) 22.0 - 29.0 mmol/L    Calcium 9.2 8.6 - 10.5 mg/dL    Total Protein 7.2 6.0 - 8.5 g/dL    Albumin 3.00 (L) 3.50 - 5.20 g/dL    ALT (SGPT) 13 1 - 33 U/L    AST (SGOT) 9 1 - 32 U/L    Alkaline Phosphatase 207 (H) 39 - 117 U/L    Total Bilirubin 0.3 0.1 - 1.2 mg/dL    eGFR Non African Amer 10 (L) >60 mL/min/1.73    eGFR  African Amer  >60 mL/min/1.73    Globulin 4.2 gm/dL    A/G Ratio 0.7 g/dL    BUN/Creatinine Ratio 17.3 7.0 - 25.0    Anion Gap 14.6 mmol/L   Lactic Acid, Plasma    Collection Time: 01/24/19 11:54 AM   Result Value Ref Range    Lactate 2.3 (C) 0.5 - 2.0 mmol/L   CBC Auto Differential    Collection Time: 01/24/19 11:54 AM   Result Value Ref Range    WBC 9.99 4.50 - 10.70 10*3/mm3    RBC 3.13 (L) 3.90 - 5.20 10*6/mm3     Hemoglobin 9.2 (L) 11.9 - 15.5 g/dL    Hematocrit 31.0 (L) 35.6 - 45.5 %    MCV 99.0 (H) 80.5 - 98.2 fL    MCH 29.4 26.9 - 32.0 pg    MCHC 29.7 (L) 32.4 - 36.3 g/dL    RDW 15.6 (H) 11.7 - 13.0 %    RDW-SD 56.4 (H) 37.0 - 54.0 fl    MPV 9.0 6.0 - 12.0 fL    Platelets 214 140 - 500 10*3/mm3    Neutrophil % 75.4 42.7 - 76.0 %    Lymphocyte % 10.3 (L) 19.6 - 45.3 %    Monocyte % 12.7 (H) 5.0 - 12.0 %    Eosinophil % 0.2 (L) 0.3 - 6.2 %    Basophil % 0.2 0.0 - 1.5 %    Immature Grans % 1.2 (H) 0.0 - 0.5 %    Neutrophils, Absolute 7.53 1.90 - 8.10 10*3/mm3    Lymphocytes, Absolute 1.03 0.90 - 4.80 10*3/mm3    Monocytes, Absolute 1.27 (H) 0.20 - 1.20 10*3/mm3    Eosinophils, Absolute 0.02 0.00 - 0.70 10*3/mm3    Basophils, Absolute 0.02 0.00 - 0.20 10*3/mm3    Immature Grans, Absolute 0.12 (H) 0.00 - 0.03 10*3/mm3   Light Blue Top    Collection Time: 01/24/19 11:54 AM   Result Value Ref Range    Extra Tube hold for add-on    Green Top (Gel)    Collection Time: 01/24/19 11:54 AM   Result Value Ref Range    Extra Tube Hold for add-ons.    Lavender Top    Collection Time: 01/24/19 11:54 AM   Result Value Ref Range    Extra Tube hold for add-on    Gold Top - SST    Collection Time: 01/24/19 11:54 AM   Result Value Ref Range    Extra Tube Hold for add-ons.    Scan Slide    Collection Time: 01/24/19 11:54 AM   Result Value Ref Range    RBC Morphology Normal Normal    WBC Morphology Normal Normal    Platelet Morphology Normal Normal   Lactic Acid, Reflex Timer (This will reflex a repeat order 3-3:15 hours after ordered.)    Collection Time: 01/24/19 11:54 AM   Result Value Ref Range    Extra Tube Hold for add-ons.    Respiratory Panel, PCR - Swab, Nasopharynx    Collection Time: 01/24/19 11:55 AM   Result Value Ref Range    ADENOVIRUS, PCR Not Detected Not Detected    Coronavirus 229E Not Detected Not Detected    Coronavirus HKU1 Not Detected Not Detected    Coronavirus NL63 Not Detected Not Detected    Coronavirus OC43 Not  Detected Not Detected    Human Metapneumovirus Not Detected Not Detected    Human Rhinovirus/Enterovirus Not Detected Not Detected    Influenza B PCR Not Detected Not Detected    Parainfluenza Virus 1 Not Detected Not Detected    Parainfluenza Virus 2 Not Detected Not Detected    Parainfluenza Virus 3 Not Detected Not Detected    Parainfluenza Virus 4 Not Detected Not Detected    Bordetella pertussis pcr Not Detected Not Detected    Influenza A H1 2009 PCR Not Detected Not Detected    Chlamydophila pneumoniae PCR Not Detected Not Detected    Mycoplasma pneumo by PCR Not Detected Not Detected    Influenza A PCR Not Detected Not Detected    Influenza A H3 Not Detected Not Detected    Influenza A H1 Not Detected Not Detected    RSV, PCR Not Detected Not Detected    Bordetella parapertussis PCR Not Detected Not Detected   Urinalysis With Microscopic If Indicated (No Culture) - Urine, Catheter    Collection Time: 01/24/19 12:22 PM   Result Value Ref Range    Color, UA Dark Yellow (A) Yellow, Straw    Appearance, UA Turbid (A) Clear    pH, UA <=5.0 5.0 - 8.0    Specific Gravity, UA 1.026 1.005 - 1.030    Glucose,  mg/dL (1+) (A) Negative    Ketones, UA Negative Negative    Bilirubin, UA Negative Negative    Blood, UA Large (3+) (A) Negative    Protein, UA >=300 mg/dL (3+) (A) Negative    Leuk Esterase, UA Moderate (2+) (A) Negative    Nitrite, UA Positive (A) Negative    Urobilinogen, UA 0.2 E.U./dL 0.2 - 1.0 E.U./dL   Urinalysis, Microscopic Only - Urine, Catheter    Collection Time: 01/24/19 12:22 PM   Result Value Ref Range    RBC, UA Unable to determine due to loaded field (A) None Seen, 0-2 /HPF    WBC, UA Too Numerous to Count (A) None Seen, 0-2 /HPF    Bacteria, UA Unable to determine due to loaded field (A) None Seen /HPF    Squamous Epithelial Cells, UA Unable to determine due to loaded field (A) None Seen, 0-2 /HPF    Hyaline Casts, UA Unable to determine due to loaded field None Seen /LPF    Methodology  Manual Light Microscopy    POC Glucose Once    Collection Time: 01/24/19  2:14 PM   Result Value Ref Range    Glucose 489 (C) 70 - 130 mg/dL       I ordered the above labs and reviewed the results      RADIOLOGY  XR Chest 2 View (Final result)   Result time 01/24/19 13:44:43   Final result by Adrian Palmer MD (01/24/19 13:44:43)                Narrative:    XR CHEST 2 VW-     Clinical: Cough, possible aspiration     COMPARISON: None     FINDINGS: Patient is rotated. There is indistinctness of the left heart  border with some vague increased opacity at this location suggesting  lingular segment infiltrate. No gross consolidation, edema or effusion.  Cardiac size upper normal to mildly enlarged. There are monitoring leads  superimposing the chest in the remainder is unremarkable.     This report was finalized on 1/24/2019 1:44 PM by Dr. Adrian Palmer M.D.                   I ordered the above noted radiological studies and reviewed the images on the PACS system.        EKG    EKG was interpreted by Dr. Still. See Dr Still's note for EKG interpretation.       MEDICAL RECORD REVIEW    2 months ago, hgb was 10.5, BUN was 44, creatinine was 3.07, potassium was 5.8, and alk phos was 248.     Per Dr Plata's note during ED visit on 11/8/18- Family does not feel that pt would like to undergo dialysis due to poor quality of life.      PROCEDURES    Critical Care  Performed by: Jennifer Stone APRN  Authorized by: Norberto Still MD     Critical care provider statement:     Critical care time (minutes):  35    Critical care time was exclusive of:  Separately billable procedures and treating other patients    Critical care was necessary to treat or prevent imminent or life-threatening deterioration of the following conditions:  Metabolic crisis, renal failure and respiratory failure    Critical care was time spent personally by me on the following activities:  Development of treatment plan with patient or  surrogate, discussions with consultants, evaluation of patient's response to treatment, examination of patient, obtaining history from patient or surrogate, review of old charts, re-evaluation of patient's condition, pulse oximetry, ordering and review of radiographic studies, ordering and review of laboratory studies and ordering and performing treatments and interventions          PROGRESS AND CONSULTS    1138- Ordered blood work, respiratory panel PCR, blood cultures, lactic acid, UA, and CXR for further evaluation. Ordered 500ml IV fluid bolus for hydration and albuterol nebulizer for dyspnea.     1140- Blood glucose is elevated at 448. Ordered 10 units insulin for hyperglycemia.     1150- Ordered ativan to relax pt.     1325- Reviewed labs-> 2 months ago, hgb was 10.5, BUN was 44, creatinine was 3.07, and potassium was 5.8. Today, hgb has decreased to 9.2, BUN is elevated at 78, creatinine is elevated at 4.52, potassium is elevated at 5.7, and lactic acid is elevated at 2.3.      1331- Added EKG to orders for further evaluation.     1405- Reviewed pt's history and workup with Dr. Still.  At bedside evaluation, they agree with the plan of care.    1411- Ordered zosyn for UTI and aspiration pneumonia.      1415- Per RN, pt's blood glucose has increased to 489 despite administration of insulin.     Sent call out to on call nephrologist. Admission decision time= 1415    1416- Rechecked pt. She is resting comfortably and is in no acute distress. BP is 91/69. Additional family members are at pt's bedside. Reviewed with family about all pertinent results including BUN of 78 and creatinine of 4.52 (both of which have increased from previous), elevated potassium of 5.7, elevated lactic acid, elevated blood glucose level in 400s, UTI indicated on UA, and CXR findings (aspiration pneumonia). Informed family that pt is in serious condition. Discussed with family at length about dialysis and medical management of  hyperkalemia. Family declines dialysis due to pt's poor quality of health. Notified family that if pt does not undergo treatment for her renal failure, there is a likely possibility that pt's acidosis and hyperkalemia will worsen, causing pt to have poor prognosis for survival. Family verbalizes understanding of the risks of not dialyzing pt including worsening of condition, complications, and death. Additionally, family reports that pt is DNR and that they have been recently considering hospice care for pt. Family requests some time to decide if they would like for pt to be placed in palliative care for comfort measures.     1438- Discussed case with Dr Cotto (nephrologist)  Reviewed history, exam, results and treatments.  Discussed concerns and plan of care. Dr Cotto states that he will see pt in consult if family would like for pt to be treated for her renal failure and hyperkalemia.      1440-  Family requests for pt to be admitted to palliative care but would like for her to be treated for UTI, aspiration pneumonia, and hyperglycemia. They do not want pt to undergo dialysis or any heroic measures. Informed family that we will speak to palliative care to see if they can administer abx and treat hyperglycemia. Family verbalizes understanding.     1449- Ordered 10 more units insulin for hyperglycemia.     1510- Informed Dr Cotto's  that family does not want pt to be treated for her renal failure and hyperkalemia.  states that they will pass on this message to Dr Cotto.    1515- Family now states that they are aware that treating pt with abx and insulin will only prolong the inevitable and so they would like for pt to be comfort care measures only and no longer want pt to be treated for UTI, aspiration pneumonia, and hyperglycemia.     1520- Discussed case with Dr House (palliative care)  Reviewed history, exam, results and treatments.  Discussed concerns and plan of care. Dr House  "accepts pt to be admitted to palliative care med/surg.      ADMISSION    Discussed treatment plan and reason for admission with pt/family and admitting physician.  Pt/family voiced understanding of the plan for admission.      DIAGNOSIS  Final diagnoses:   Aspiration pneumonia, unspecified aspiration pneumonia type, unspecified laterality, unspecified part of lung (CMS/HCC)   Urinary tract infection in female   Acute renal failure superimposed on chronic kidney disease, unspecified CKD stage, unspecified acute renal failure type (CMS/HCC)   Hyperkalemia   Hyperglycemia         COURSE & MEDICAL DECISION MAKING  Pertinent Labs and Imaging studies that were ordered and reviewed are noted above.  Results were reviewed/discussed with the patient's family and they were also made aware of online assess.   Pt's family also made aware that some labs, such as cultures, will not be resulted during ER visit and follow up with PMD is necessary.     MEDICATIONS GIVEN IN ER  Medications   sodium chloride 0.9 % flush 10 mL (not administered)   Pharmacy to Dose Zosyn ( Does not apply Not Given 1/24/19 1420)   sodium chloride 0.9 % bolus 500 mL (0 mL Intravenous Stopped 1/24/19 1303)   albuterol (PROVENTIL) nebulizer solution 0.083% 2.5 mg/3mL (2.5 mg Nebulization Given 1/24/19 1156)   insulin regular (humuLIN R,novoLIN R) injection 10 Units (10 Units Subcutaneous Given 1/24/19 1204)   LORazepam (ATIVAN) injection 0.5 mg (0.5 mg Intravenous Given 1/24/19 1201)   piperacillin-tazobactam (ZOSYN) 4.5 g in iso-osmotic dextrose 100 mL IVPB (premix) (0 g Intravenous Stopped 1/24/19 1449)   insulin regular (humuLIN R,novoLIN R) injection 10 Units (10 Units Intravenous Given 1/24/19 1453)       /83   Pulse 76   Temp 99.1 °F (37.3 °C) (Tympanic)   Resp 20   Ht 149.9 cm (59\")   Wt 118 kg (260 lb)   SpO2 99%   BMI 52.51 kg/m²       I personally reviewed the past medical history, past surgical history, social history, family " history, current medications and allergies as they appear in this chart.  The scribe's note accurately reflects the work and decisions made by me.     Documentation assistance provided by pradeep Hinojosa for TARIK Toth on 1/24/2019 at 3:31 PM. Information recorded by the scribe was done at my direction and has been verified and validated by me.       Micaela, William  01/25/19 0605       Jennifer Stone, APRN  01/26/19 1003

## 2019-01-24 NOTE — ED PROVIDER NOTES
Pt is a 58 y.o. female who presents to the ED complaining of vomiting, cough, and decreased PO intake for two days. Pt has Hx of intellectual disability, BM, and CKD.     On exam,  Constitutional: Moderate distress  HENT: moist mucous membranes  Eye: pale conjunctiva, PERRL   Cardiovascular: Heart is regular rhythm. Tachycardic.   Pulmonary: Weak wet cough. Bibasilar rhonchi   Abdomen: soft, nontender  Musculoskeletal: 2+ pedal edema. No calf tendernes  Neurological: Awake. No focal neuro deficits.     Labs and imaging reviewed.   Lactate-2.3; UA: nitrite positive; glucose-489; CMP: potassium 5.7    Pt is critically ill in acute renal failure with hyperkalemia and metabolic acidosis.  Mother and patient do not want dialysis.  After a long talk with the patient, mother, and sister the plan is to just keep her comfortable with no heroic measures.  She is a DNR.   D/W Dr House who will admit patient to the palliative care unit.    Plan: Admit to palliative care      MD ATTESTATION NOTE    The COURT and I have discussed this patient's history, physical exam, and treatment plan.  I have reviewed the documentation and personally had a face to face interaction with the patient. I affirm the documentation and agree with the treatment and plan.  The attached note describes my personal findings.      Documentation assistance provided by pradeep Thurston for Dr. Still. Information recorded by the scrbetty was done at my direction and has been verified and validated by me.           Kwame Thurston  01/24/19 4286       Norberto Still MD  01/25/19 1039

## 2019-01-24 NOTE — DISCHARGE PLACEMENT REQUEST
"Virginie Canela (58 y.o. Female)     Date of Birth Social Security Number Address Home Phone MRN    1960  [id number] [facility] [address]  [id number]    [employer] Marital Status          Unknown Single       Admission Date Admission Type Admitting Provider Attending Provider Department, Room/Bed    1/24/19 Emergency Damián House MD Purvis, Gregory, MD [facility], 26/26    Discharge Date Discharge Disposition Discharge Destination                       Attending Provider:  Norberto Still MD    Allergies:  No Known Allergies    Isolation:  None   Infection:  None   Code Status:  Prior    Ht:  149.9 cm (59\")   Wt:  118 kg (260 lb)    Admission Cmt:  None   Principal Problem:  None                Active Insurance as of 1/24/2019     Primary Coverage     Payor Plan Insurance Group Employer/Plan Group    MEDICARE MEDICARE A & B      Payor Plan Address Payor Plan Phone Number Payor Plan Fax Number Effective Dates    [address] [phone]  12/1/2001 - None Entered    [facility] [id number]       Subscriber Name Subscriber Birth Date Member ID       VIRGINIE CANELA 1960 [id number]           Secondary Coverage     Payor Plan Insurance Group Employer/Plan Group     FOR LIFE  FOR LIFE MC SUPP      Payor Plan Address Payor Plan Phone Number Payor Plan Fax Number Effective Dates    [address] [phone]  10/2/2018 - None Entered    [facility] [address]       Subscriber Name Subscriber Birth Date Member ID       VIRGINIE CANELA 1960 [id number]           Tertiary Coverage     Payor Plan Insurance Group Employer/Plan Group    [place]      Payor Plan Address Payor Plan Phone Number Payor Plan Fax Number Effective Dates    [address] [phone]  10/2/2018 - None Entered    [facility] [place] [address]       Subscriber Name Subscriber Birth Date Member ID       VIRGINIE CANELA 1960 [id number]           "       Emergency Contacts      (Rel.) Home Phone Work Phone Mobile Phone    Leonor Vang (Sister) -- -- [phone]    Sage Damon (Brother) -- -- [phone]    Kain Damon (Brother) -- -- [phone]

## 2019-01-25 PROBLEM — N17.9 ACUTE RENAL FAILURE (ARF) (HCC): Status: ACTIVE | Noted: 2019-01-01

## 2019-01-25 NOTE — PROGRESS NOTES
Discharge Planning Assessment  [facility]     Patient Name: Virginie Damon  MRN: [id number]  Today's Date: 1/25/2019    Admit Date: 1/24/2019    Discharge Needs Assessment    No documentation.       Discharge Plan     Row Name 01/25/19 0851       Plan    Plan Comments  The patient was transferred to [facility] from ER on 1/24/19 @ 19:00. The patient is palliative. [facility] consult in Epic. CCP will screen and follow for any needs that may arise. CARO Hansen RN, CCP.         Destination      Service Provider Request Status Selected Services Address Phone Number Fax Number    [facility] Pending - No Request Sent N/A [address] [facility] [address] [phone]      Durable Medical Equipment      No service coordination in this encounter.      Dialysis/Infusion      No service coordination in this encounter.      Home Medical Care      No service coordination in this encounter.      Community Resources      No service coordination in this encounter.          Demographic Summary    No documentation.       Functional Status    No documentation.       Psychosocial    No documentation.       Abuse/Neglect    No documentation.       Legal    No documentation.       Substance Abuse    No documentation.       Patient Forms    No documentation.           Eleonora Hansen RN

## 2019-01-25 NOTE — NURSING NOTE
Called unit and spoke with primary RN after revieweing chart and photos.  Will begin z guard to buttocks and aquaphor to legs; if intact blisters on legs open, will begin vaseline gauze drsgs with kerlex roll.  Discussed all with primary RN. Also to place pump on bed.

## 2019-01-25 NOTE — PLAN OF CARE
"Palliative care  met with the patient's mother Estelita Damon \"Wenceslao\" [phone]/ [phone] at bedside.  The patient's mother stated that the patient's  and sister in law could remain present in the room.  The patient's mother expressed being appreciative of visit from  and stated that her daughter is \"her baby\" and she will always be by her side as she has been her caregiver all 60 years of her life.  The patient's mother stated that the nurses have been wonderful and that although her daughter has not been on the unit long she seems to be much more comfortable today than she was yesterday.  The patient's mother stated that that the patient's  from [employer] on [address] has been very involved in the patient's life as the patient has been part of that program for 7 years.  The patient's mother stated that the patient's  transports her to the hospital to visit her daughter and will take her home as well.  The patient's mother discussed how she has recently been in the hospital herself and has a new diagnosis of pneumonia and asthma therefore has to now be on oxygen but she is not going to let that stop her from being by her daughters side.   encouraged the patient's mother to continue to take care of herself and her health and the she stated that she would.  The patient's mother stated that she had plenty of family support and will be fine during this transition.   will follow up with the patient's mother as needed for support and encouragement.  ANTHONY Rainey  "

## 2019-01-25 NOTE — PLAN OF CARE
Problem: Fall Risk (Adult)  Goal: Absence of Fall  Outcome: Ongoing (interventions implemented as appropriate)      Problem: Patient Care Overview  Goal: Plan of Care Review  Outcome: Ongoing (interventions implemented as appropriate)   01/25/19 9552   Coping/Psychosocial   Plan of Care Reviewed With patient;mother   Plan of Care Review   Progress declining   OTHER   Outcome Summary Pt. continued with palliative care. Premedicated for turns and silva insertion. Scopolamine patch applied. Will continue to monitor.     Goal: Individualization and Mutuality  Outcome: Ongoing (interventions implemented as appropriate)    Goal: Discharge Needs Assessment  Outcome: Ongoing (interventions implemented as appropriate)      Problem: Skin Injury Risk (Adult)  Goal: Skin Health and Integrity  Outcome: Ongoing (interventions implemented as appropriate)      Problem: Dying Patient, Actively (Adult)  Goal: Comfort/Pain Control  Outcome: Ongoing (interventions implemented as appropriate)    Goal: Peace/Preservation of Dignity During the Dying Process  Outcome: Ongoing (interventions implemented as appropriate)

## 2019-01-25 NOTE — PLAN OF CARE
Problem: Patient Care Overview  Goal: Plan of Care Review  Continue symptom management and comfort care   01/25/19 5649   Coping/Psychosocial   Plan of Care Reviewed With patient;caregiver;mother;family   Plan of Care Review   Progress declining   OTHER   Outcome Summary Patient rested well overnight. Admitted to us from the ED. Rapid decline at home. Pt has difficulty with communication. Mother is at bedside. Medicated for pain Q4 hours per family request. No silva at this time. Will continue to monitor.

## 2019-01-25 NOTE — CONSULTS
Spiritual Care: Patient sleeping. Introduced myself to family. Family mentioned they're a close knit family and t also appreciated the visit.  Will continue to follow up regularly.  shahla Arriaga

## 2019-01-25 NOTE — PROGRESS NOTES
Palliative Care/Hospice Follow Up Note       LOS: 1 day   Patient Care Team:  Nelly Kaur APRN as PCP - General (Family Medicine)  Damián House MD as Consulting Physician (Hospice and Palliative Medicine)    Chief Complaint:  ARF/CKD3    Interval History:     Patient Complaints: None  Patient Denies:  None  History taken from:  Mom and RN    Review of Systems: As above.     Palliative Performance Scale  Palliative Performance Scale Score: 10%  [place] Symptom Assessment System Revised  Pain Score: no pain   ESAS Tiredness Score: Worst possible tiredness  ESAS Nausea Score: No nausea  ESAS Depression Score: unable to assess  ESAS Anxiety Score: No anxiety  ESAS Drowsiness Score: Worst possible drowsiness  ESAS Lack of Appetite Score: Worst lack of appetite  ESAS Wellbeing Score: unable to assess  ESAS Dyspnea Score: No shortness of breath  ESAS Other Problem Score: unable to assess  ESAS Source of Information: healthcare professional caregiver, family caregiver  ESAS Intervention: medicated/see MAR  ESAS Intervention Response: tolerated    Vital Signs  Temp:  [99.4 °F (37.4 °C)] 99.4 °F (37.4 °C)  Heart Rate:  [71-85] 85  Resp:  [16-18] 16  BP: ()/(47-75) 82/47  Device (Oxygen Therapy): nasal cannulaFlow (L/min):  [2] 2SpO2:  [92 %-98 %] 92 %    Physical Exam:  General Appearance:    Not awake and appears in no acute distress   Throat:   No oral lesions, oral mucosa somewhat moist   Neck:   No adenopathy, supple, trachea midline   Lungs:     Clear to auscultation, occasional upper rhonchi, respirations regular, even    Heart:    Regular rhythm and normal rate   Abdomen:     Occasional bowel sounds, soft and non-tender, non-distended   Extremities:   >2+ edema and chronic venous stasis, no cyanosis   Pulses:   Radial pulses palpable and equal bilaterally          Results Review:     I reviewed the patient's new clinical results.    Medication Reviewed.    Assessment/Plan       ARF (acute renal  failure) (CMS/HCC)    Aspiration pneumonia (CMS/HCC)    Lingular pneumonia    Mental retardation    CKD (chronic kidney disease) stage 3, GFR 30-59 ml/min (CMS/[facility])    Type 2 diabetes mellitus with renal complication (CMS/HCC)    ALIDA (obstructive sleep apnea); suspected    Pulmonary hypertension (CMS/HCC)    Chronic venous stasis dermatitis of both lower extremities    Edema of both lower extremities    HTN (hypertension)    I reviewed all with the patient's mom at bedside. The [atient appears more comfortable with meds. The patient has received glycopyrrolate for airway secretions. The patient has required 3 doses 2 mg morphine and 1 dose 0.5 mg Ativan thus far today. The patient's mom understands the severity of the patient's situation. Continue comfort care.     Plan for disposition:[facility]B.    Damián House MD  Hospice and Palliative Medicine  01/25/19  3:43 PM

## 2019-01-26 NOTE — CONSULTS
Met with patients family, including Mother/POA to explain hospice services. Patient is eligible for hospice benefits and is appropriate for [facility] admission. Consents signed per POA. Patient will be admitted with primary diagnosis of ICD 10 # N18.6 (End Stage Renal Disease). We will be managing respiratory distress and pain.    Please call [phone] with any questions or concerns. Thank you for the referral.     Maddie Armando RN   [facility] Referral & Admission Coordinator

## 2019-01-28 NOTE — PROGRESS NOTES
Case Management Discharge Note    Final Note: Admitted to a \[facility]\"" scattered bed on 1/25/19. CARO Hansen RN, CCP.     Destination - Selection Complete      Service Provider Request Status Selected Services Address Phone Number Fax Number    [facility] [address] [facility] [id number] [phone]      Durable Medical Equipment      No service has been selected for the patient.      Dialysis/Infusion      No service has been selected for the patient.      Home Medical Care      No service has been selected for the patient.      Community Resources      No service has been selected for the patient.             Final Discharge Disposition Code: 51 - hospice medical facility

## 2019-01-29 LAB
BACTERIA SPEC AEROBE CULT: NORMAL
BACTERIA SPEC AEROBE CULT: NORMAL